# Patient Record
Sex: MALE | Race: WHITE | NOT HISPANIC OR LATINO | Employment: FULL TIME | URBAN - NONMETROPOLITAN AREA
[De-identification: names, ages, dates, MRNs, and addresses within clinical notes are randomized per-mention and may not be internally consistent; named-entity substitution may affect disease eponyms.]

---

## 2019-02-12 ENCOUNTER — HOSPITAL ENCOUNTER (EMERGENCY)
Facility: HOSPITAL | Age: 24
Discharge: HOME | End: 2019-02-12
Attending: PHYSICIAN ASSISTANT | Admitting: PHYSICIAN ASSISTANT

## 2019-02-12 ENCOUNTER — APPOINTMENT (OUTPATIENT)
Dept: GENERAL RADIOLOGY | Facility: HOSPITAL | Age: 24
End: 2019-02-12
Attending: PHYSICIAN ASSISTANT

## 2019-02-12 VITALS
RESPIRATION RATE: 14 BRPM | SYSTOLIC BLOOD PRESSURE: 111 MMHG | TEMPERATURE: 98.6 F | WEIGHT: 155 LBS | BODY MASS INDEX: 20.73 KG/M2 | OXYGEN SATURATION: 100 % | DIASTOLIC BLOOD PRESSURE: 76 MMHG

## 2019-02-12 DIAGNOSIS — R00.2 PALPITATIONS: ICD-10-CM

## 2019-02-12 DIAGNOSIS — R07.89 ATYPICAL CHEST PAIN: ICD-10-CM

## 2019-02-12 LAB
ALBUMIN SERPL-MCNC: 4.1 G/DL (ref 3.4–5)
ALP SERPL-CCNC: 70 U/L (ref 40–150)
ALT SERPL W P-5'-P-CCNC: 32 U/L (ref 0–70)
ANION GAP SERPL CALCULATED.3IONS-SCNC: 7 MMOL/L (ref 3–14)
AST SERPL W P-5'-P-CCNC: 17 U/L (ref 0–45)
BASOPHILS # BLD AUTO: 0.1 10E9/L (ref 0–0.2)
BASOPHILS NFR BLD AUTO: 0.7 %
BILIRUB SERPL-MCNC: 1.2 MG/DL (ref 0.2–1.3)
BUN SERPL-MCNC: 11 MG/DL (ref 7–30)
CALCIUM SERPL-MCNC: 9 MG/DL (ref 8.5–10.1)
CHLORIDE SERPL-SCNC: 106 MMOL/L (ref 94–109)
CO2 SERPL-SCNC: 27 MMOL/L (ref 20–32)
CREAT SERPL-MCNC: 0.81 MG/DL (ref 0.66–1.25)
D DIMER PPP DDU-MCNC: <200 NG/ML D-DU (ref 0–300)
DIFFERENTIAL METHOD BLD: NORMAL
EOSINOPHIL # BLD AUTO: 0.3 10E9/L (ref 0–0.7)
EOSINOPHIL NFR BLD AUTO: 2.8 %
ERYTHROCYTE [DISTWIDTH] IN BLOOD BY AUTOMATED COUNT: 12.2 % (ref 10–15)
GFR SERPL CREATININE-BSD FRML MDRD: >90 ML/MIN/{1.73_M2}
GLUCOSE SERPL-MCNC: 91 MG/DL (ref 70–99)
HCT VFR BLD AUTO: 44.4 % (ref 40–53)
HGB BLD-MCNC: 15.8 G/DL (ref 13.3–17.7)
IMM GRANULOCYTES # BLD: 0 10E9/L (ref 0–0.4)
IMM GRANULOCYTES NFR BLD: 0.5 %
LYMPHOCYTES # BLD AUTO: 2.3 10E9/L (ref 0.8–5.3)
LYMPHOCYTES NFR BLD AUTO: 25.7 %
MCH RBC QN AUTO: 30.4 PG (ref 26.5–33)
MCHC RBC AUTO-ENTMCNC: 35.6 G/DL (ref 31.5–36.5)
MCV RBC AUTO: 85 FL (ref 78–100)
MONOCYTES # BLD AUTO: 0.6 10E9/L (ref 0–1.3)
MONOCYTES NFR BLD AUTO: 6.7 %
NEUTROPHILS # BLD AUTO: 5.6 10E9/L (ref 1.6–8.3)
NEUTROPHILS NFR BLD AUTO: 63.6 %
NRBC # BLD AUTO: 0 10*3/UL
NRBC BLD AUTO-RTO: 0 /100
PLATELET # BLD AUTO: 206 10E9/L (ref 150–450)
POTASSIUM SERPL-SCNC: 4.3 MMOL/L (ref 3.4–5.3)
PROT SERPL-MCNC: 7.2 G/DL (ref 6.8–8.8)
RBC # BLD AUTO: 5.2 10E12/L (ref 4.4–5.9)
SODIUM SERPL-SCNC: 140 MMOL/L (ref 133–144)
TROPONIN I SERPL-MCNC: <0.015 UG/L (ref 0–0.04)
WBC # BLD AUTO: 8.8 10E9/L (ref 4–11)

## 2019-02-12 PROCEDURE — 84484 ASSAY OF TROPONIN QUANT: CPT | Performed by: PHYSICIAN ASSISTANT

## 2019-02-12 PROCEDURE — 36415 COLL VENOUS BLD VENIPUNCTURE: CPT | Performed by: PHYSICIAN ASSISTANT

## 2019-02-12 PROCEDURE — 71046 X-RAY EXAM CHEST 2 VIEWS: CPT | Mod: TC

## 2019-02-12 PROCEDURE — 80053 COMPREHEN METABOLIC PANEL: CPT | Performed by: PHYSICIAN ASSISTANT

## 2019-02-12 PROCEDURE — 85025 COMPLETE CBC W/AUTO DIFF WBC: CPT | Performed by: PHYSICIAN ASSISTANT

## 2019-02-12 PROCEDURE — 25000128 H RX IP 250 OP 636: Performed by: PHYSICIAN ASSISTANT

## 2019-02-12 PROCEDURE — 99285 EMERGENCY DEPT VISIT HI MDM: CPT | Mod: Z6 | Performed by: PHYSICIAN ASSISTANT

## 2019-02-12 PROCEDURE — 99285 EMERGENCY DEPT VISIT HI MDM: CPT | Mod: 25

## 2019-02-12 PROCEDURE — 93010 ELECTROCARDIOGRAM REPORT: CPT | Performed by: INTERNAL MEDICINE

## 2019-02-12 PROCEDURE — 85379 FIBRIN DEGRADATION QUANT: CPT | Performed by: PHYSICIAN ASSISTANT

## 2019-02-12 PROCEDURE — 93005 ELECTROCARDIOGRAM TRACING: CPT

## 2019-02-12 RX ORDER — SODIUM CHLORIDE 9 MG/ML
1000 INJECTION, SOLUTION INTRAVENOUS CONTINUOUS
Status: DISCONTINUED | OUTPATIENT
Start: 2019-02-12 | End: 2019-02-12 | Stop reason: HOSPADM

## 2019-02-12 RX ADMIN — SODIUM CHLORIDE 1000 ML: 9 INJECTION, SOLUTION INTRAVENOUS at 17:08

## 2019-02-12 ASSESSMENT — ENCOUNTER SYMPTOMS
VOMITING: 0
BACK PAIN: 0
NERVOUS/ANXIOUS: 1
SHORTNESS OF BREATH: 0
FATIGUE: 0
COUGH: 0
NAUSEA: 0
ACTIVITY CHANGE: 0
CHILLS: 0
WHEEZING: 0
CHEST TIGHTNESS: 0
APPETITE CHANGE: 0
PHOTOPHOBIA: 0
FEVER: 0
ABDOMINAL PAIN: 0

## 2019-02-12 NOTE — ED TRIAGE NOTES
"Pt states he was sitting on the couch playing video games when started feeling like he was getting tunnel vision and than felt hot \"and then my heart started beating really fast\". States both arms and left foot started tingling shortly after. States 2 weeks ago he had LOC while ice fishing after he stood up from sitting fast. C/O left chest pain \"mostly\" when he moves his arms. FAST neuro negative.  "

## 2019-02-12 NOTE — ED PROVIDER NOTES
"  History     Chief Complaint   Patient presents with     Irregular Heart Beat     The history is provided by the patient.     Leonel Norton is a 23 year old male who presented to the emergency department ambulatory along with significant other for evaluation of chest pain, palpitations, and bilateral arm paresthesias.  Patient tells me that he was sitting on his couch watching a movie when he began to develop a \"racing heart\" and bilateral arm tingling.  He began to have what seems to be a panic attack and attempted breathing exercises.  He then developed some foot paresthesias as well.  He began to control his palpitations and developed some left-sided chest pain.  He elected to present here for evaluation.  Currently reports feeling at his baseline other than left-sided chest discomfort.  Tells me he was ice fishing last week when he stood up quickly and had a near syncopal episode.  Denies any headaches.  Denies any blood thinners.  Denies any drugs or alcohol.  Denies any other questions or concerns.  Currently denies any dyspnea.    Allergies:  Allergies   Allergen Reactions     Lamotrigine      Topiramate      Topamax       Problem List:    Patient Active Problem List    Diagnosis Date Noted     ACP (advance care planning) 11/02/2016     Priority: Medium     Advance Care Planning 11/2/2016: ACP Review of Chart / Resources Provided:  Reviewed chart for advance care plan.  Leonel Norton has been provided information and resources to begin or update their advance care plan.  Added by Kiya Hsu                      Past Medical History:    Past Medical History:   Diagnosis Date     Attention deficit disorder of childhood with hyper 7/20/2012       Past Surgical History:    No past surgical history on file.    Family History:    Family History   Problem Relation Age of Onset     Diabetes Other         close relative     Hypertension Paternal Grandfather        Social History:  Marital Status:  Single " [1]  Social History     Tobacco Use     Smoking status: Current Every Day Smoker     Packs/day: 1.00   Substance Use Topics     Alcohol use: Yes     Comment: occassionaly      Drug use: No        Medications:      lisdexamfetamine (VYVANSE) 40 MG capsule         Review of Systems   Constitutional: Negative for activity change, appetite change, chills, fatigue and fever.   Eyes: Negative for photophobia and visual disturbance.   Respiratory: Negative for cough, chest tightness, shortness of breath and wheezing.    Cardiovascular:        Please see history of present illness.  The patient currently denies any questions or concerns.   Gastrointestinal: Negative for abdominal pain, nausea and vomiting.   Genitourinary: Negative.    Musculoskeletal: Negative for back pain.   Skin: Negative.    Neurological:        Please see HPI.   Psychiatric/Behavioral: The patient is nervous/anxious.        Physical Exam   BP: 137/89  Heart Rate: 69  Temp: 97.9  F (36.6  C)  Resp: 16  Weight: 70.3 kg (155 lb)  SpO2: 99 %      Physical Exam   Constitutional: He is oriented to person, place, and time. He appears well-developed and well-nourished. No distress.   HENT:   Head: Normocephalic and atraumatic.   Right Ear: External ear normal.   Left Ear: External ear normal.   Mouth/Throat: Oropharynx is clear and moist.   Tympanic membranes are negative.   Eyes: Conjunctivae and EOM are normal. Pupils are equal, round, and reactive to light.   Neck: Normal range of motion.   Cardiovascular: Normal rate and regular rhythm.   Pulmonary/Chest: Effort normal and breath sounds normal. He exhibits no tenderness.   Abdominal: Soft. He exhibits no distension. There is no tenderness. There is no guarding.   Musculoskeletal: He exhibits no edema.   Lymphadenopathy:     He has no cervical adenopathy.   Neurological: He is alert and oriented to person, place, and time.   Skin: Skin is warm and dry. Capillary refill takes less than 2 seconds.    Psychiatric: He has a normal mood and affect.   Nursing note and vitals reviewed.      ED Course        Procedures  EKG shows a sinus bradycardia at a rate of 59.  Normal WA interval.  Normal QRS duration.  Normal QTC.  Normal axis.  Normal P wave duration.  There are no concerning ST segments.  There are no concerning T waves.  There is no evidence of ectopy, preexcitation, or ischemia.  No previous EKGs available for comparison.        Chest x-ray is negative for focal infiltrate, pneumothorax, or widened mediastinum.    Critical Care time:  none               Results for orders placed or performed during the hospital encounter of 02/12/19 (from the past 24 hour(s))   CBC with platelets differential   Result Value Ref Range    WBC 8.8 4.0 - 11.0 10e9/L    RBC Count 5.20 4.4 - 5.9 10e12/L    Hemoglobin 15.8 13.3 - 17.7 g/dL    Hematocrit 44.4 40.0 - 53.0 %    MCV 85 78 - 100 fl    MCH 30.4 26.5 - 33.0 pg    MCHC 35.6 31.5 - 36.5 g/dL    RDW 12.2 10.0 - 15.0 %    Platelet Count 206 150 - 450 10e9/L    Diff Method Automated Method     % Neutrophils 63.6 %    % Lymphocytes 25.7 %    % Monocytes 6.7 %    % Eosinophils 2.8 %    % Basophils 0.7 %    % Immature Granulocytes 0.5 %    Nucleated RBCs 0 0 /100    Absolute Neutrophil 5.6 1.6 - 8.3 10e9/L    Absolute Lymphocytes 2.3 0.8 - 5.3 10e9/L    Absolute Monocytes 0.6 0.0 - 1.3 10e9/L    Absolute Eosinophils 0.3 0.0 - 0.7 10e9/L    Absolute Basophils 0.1 0.0 - 0.2 10e9/L    Abs Immature Granulocytes 0.0 0 - 0.4 10e9/L    Absolute Nucleated RBC 0.0    Comprehensive metabolic panel   Result Value Ref Range    Sodium 140 133 - 144 mmol/L    Potassium 4.3 3.4 - 5.3 mmol/L    Chloride 106 94 - 109 mmol/L    Carbon Dioxide 27 20 - 32 mmol/L    Anion Gap 7 3 - 14 mmol/L    Glucose 91 70 - 99 mg/dL    Urea Nitrogen 11 7 - 30 mg/dL    Creatinine 0.81 0.66 - 1.25 mg/dL    GFR Estimate >90 >60 mL/min/[1.73_m2]    GFR Estimate If Black >90 >60 mL/min/[1.73_m2]    Calcium 9.0 8.5 -  10.1 mg/dL    Bilirubin Total 1.2 0.2 - 1.3 mg/dL    Albumin 4.1 3.4 - 5.0 g/dL    Protein Total 7.2 6.8 - 8.8 g/dL    Alkaline Phosphatase 70 40 - 150 U/L    ALT 32 0 - 70 U/L    AST 17 0 - 45 U/L   D-Dimer ([facility],)   Result Value Ref Range    D-Dimer ng/mL <200 0 - 300 ng/ml D-DU   Troponin I   Result Value Ref Range    Troponin I ES <0.015 0.000 - 0.045 ug/L   XR Chest 2 Views    Narrative    XR CHEST 2 VW    HISTORY: 23 years Male chest pain    COMPARISON: 4/14/2011    TECHNIQUE: 2 views of the chest were obtained.    FINDINGS: Two views of the chest were obtained. Heart size and  pulmonary vascularity are within normal limits, lungs are clear on  both views. No consolidating air space opacities are present.          Impression    IMPRESSION: Clear chest.    CORKY KERN MD       Medications   0.9% sodium chloride BOLUS (1,000 mLs Intravenous New Bag 2/12/19 4267)     Followed by   sodium chloride 0.9% infusion (not administered)       Assessments & Plan (with Medical Decision Making)   Workup as above.  EKG is unremarkable.  Troponins unremarkable.  D-dimer is negative.  Chest x-ray is unremarkable.  Most consistent with panic attack.  However he needs close clinic follow-up.  Discussed Holter monitoring or the like.  Avoid alcohol.  Stop smoking.  Return here for any worsening symptoms, new symptoms, or other questions or concerns.    This document was prepared using a combination of typing and voice generated software.  While every attempt was made for accuracy, spelling and grammatical errors may exist.    I have reviewed the nursing notes.    I have reviewed the findings, diagnosis, plan and need for follow up with the patient.          Medication List      There are no discharge medications for this visit.         Final diagnoses:   Atypical chest pain   Palpitations       2/12/2019   [facility]     Adnrew Terrell PA-C  02/12/19 [id number]

## 2019-02-13 NOTE — DISCHARGE INSTRUCTIONS
Rest and stay hydrated.     Follow-up in the clinic to discuss further investigation.     Return here for any other questions or concerns.

## 2020-11-11 ENCOUNTER — ALLIED HEALTH/NURSE VISIT (OUTPATIENT)
Dept: FAMILY MEDICINE | Facility: OTHER | Age: 25
End: 2020-11-11
Attending: FAMILY MEDICINE
Payer: OTHER GOVERNMENT

## 2020-11-11 DIAGNOSIS — R09.89 CHEST CONGESTION: ICD-10-CM

## 2020-11-11 DIAGNOSIS — R05.9 COUGH: Primary | ICD-10-CM

## 2020-11-11 PROCEDURE — U0003 INFECTIOUS AGENT DETECTION BY NUCLEIC ACID (DNA OR RNA); SEVERE ACUTE RESPIRATORY SYNDROME CORONAVIRUS 2 (SARS-COV-2) (CORONAVIRUS DISEASE [COVID-19]), AMPLIFIED PROBE TECHNIQUE, MAKING USE OF HIGH THROUGHPUT TECHNOLOGIES AS DESCRIBED BY CMS-2020-01-R: HCPCS | Mod: ZL | Performed by: FAMILY MEDICINE

## 2020-11-11 PROCEDURE — C9803 HOPD COVID-19 SPEC COLLECT: HCPCS

## 2020-11-11 PROCEDURE — 99207 PR NO CHARGE NURSE ONLY: CPT

## 2020-11-12 LAB
SARS-COV-2 RNA SPEC QL NAA+PROBE: NOT DETECTED
SPECIMEN SOURCE: NORMAL

## 2020-12-20 ENCOUNTER — HEALTH MAINTENANCE LETTER (OUTPATIENT)
Age: 25
End: 2020-12-20

## 2021-10-03 ENCOUNTER — HEALTH MAINTENANCE LETTER (OUTPATIENT)
Age: 26
End: 2021-10-03

## 2022-01-22 ENCOUNTER — HEALTH MAINTENANCE LETTER (OUTPATIENT)
Age: 27
End: 2022-01-22

## 2022-09-04 ENCOUNTER — HEALTH MAINTENANCE LETTER (OUTPATIENT)
Age: 27
End: 2022-09-04

## 2023-03-03 ENCOUNTER — OFFICE VISIT (OUTPATIENT)
Dept: FAMILY MEDICINE | Facility: OTHER | Age: 28
End: 2023-03-03
Attending: NURSE PRACTITIONER
Payer: COMMERCIAL

## 2023-03-03 VITALS
BODY MASS INDEX: 26.75 KG/M2 | DIASTOLIC BLOOD PRESSURE: 80 MMHG | SYSTOLIC BLOOD PRESSURE: 128 MMHG | TEMPERATURE: 97.5 F | HEART RATE: 82 BPM | WEIGHT: 200 LBS | OXYGEN SATURATION: 99 %

## 2023-03-03 DIAGNOSIS — J01.90 ACUTE SINUSITIS WITH SYMPTOMS > 10 DAYS: Primary | ICD-10-CM

## 2023-03-03 PROCEDURE — 99213 OFFICE O/P EST LOW 20 MIN: CPT | Performed by: NURSE PRACTITIONER

## 2023-03-03 RX ORDER — FLUTICASONE PROPIONATE 50 MCG
1 SPRAY, SUSPENSION (ML) NASAL DAILY
Qty: 16 G | Refills: 3 | Status: SHIPPED | OUTPATIENT
Start: 2023-03-03 | End: 2023-05-31 | Stop reason: ALTCHOICE

## 2023-03-03 RX ORDER — PSEUDOEPHEDRINE HCL 60 MG
60 TABLET ORAL EVERY 4 HOURS PRN
COMMUNITY
End: 2023-05-31 | Stop reason: ALTCHOICE

## 2023-03-03 ASSESSMENT — PAIN SCALES - GENERAL: PAINLEVEL: MODERATE PAIN (4)

## 2023-03-03 NOTE — PROGRESS NOTES
Assessment & Plan     Acute sinusitis with symptoms > 10 days  Sinus pressure and swelling noted to the left side of his face.  Plan to treat with antibiotics and self sinus care.    Discussed medication and side effects  Follow up if no improvement   - amoxicillin-clavulanate (AUGMENTIN) 875-125 MG tablet; Take 1 tablet by mouth 2 times daily for 7 days  - fluticasone (FLONASE) 50 MCG/ACT nasal spray; Spray 1 spray into both nostrils daily         Nicotine/Tobacco Cessation:  He reports that he has been smoking cigarettes. He has been smoking an average of 1 pack per day. He does not have any smokeless tobacco history on file.  Nicotine/Tobacco Cessation Plan:   Encouraged smoking cessation       See Patient Instructions    No follow-ups on file.    BATSHEVA Stauffer [facility] - MARILU Castaneda is a 27 year old accompanied by his partner, presenting for the following health issues:  Sinus Problem      HPI     Acute Illness  Acute illness concerns: sinus pressure with headache  Onset/Duration: 1 week or more   Symptoms:  Fever: No  Chills/Sweats: No  Headache (location?): YES  Sinus Pressure: YES  Conjunctivitis:  No  Ear Pain: no  Rhinorrhea: YES  Congestion: No  Sore Throat: No  Cough: YES-productive of black sputum  Wheeze: No  Decreased Appetite: No  Nausea: No  Vomiting: No  Diarrhea: No  Dysuria/Freq.: No  Dysuria or Hematuria: No  Fatigue/Achiness: YES  Sick/Strep Exposure: No  Therapies tried and outcome: sudafed  Staying hydrated and drinking water       Review of Systems   CONSTITUTIONAL: NEGATIVE for fever, chills, change in weight  INTEGUMENTARY/SKIN: NEGATIVE for worrisome rashes, moles or lesions  EYES: watery drainage from left eye   ENT/MOUTH: postnasal drainage and sinus pressure  RESP:NEGATIVE for significant cough or SOB  CV: NEGATIVE for chest pain, palpitations or peripheral edema  GI: NEGATIVE for nausea, abdominal pain, heartburn, or change in bowel  habits  : negative for dysuria, hematuria, decreased urinary stream, erectile dysfunction  NEURO: sinus headache       Objective    /80   Pulse 82   Temp 97.5  F (36.4  C) (Tympanic)   Wt 90.7 kg (200 lb)   SpO2 99%   BMI 26.75 kg/m    Body mass index is 26.75 kg/m .  Physical Exam   GENERAL: alert and no distress  EYES: Eyes grossly normal to inspection, PERRL and conjunctivae and sclerae normal  HENT: normal cephalic/atraumatic, ear canals and TM's normal, nasal mucosa edematous , oropharynx clear, oral mucous membranes moist and sinuses: maxillary, frontal tenderness on left, maxillary, frontal swelling on left  RESP: lungs clear to auscultation - no rales, rhonchi or wheezes  CV: regular rate and rhythm, normal S1 S2, no S3 or S4, no murmur, click or rub, no peripheral edema and peripheral pulses strong  ABDOMEN: soft, nontender, no hepatosplenomegaly, no masses and bowel sounds normal  NEURO: Normal strength and tone, sensory exam grossly normal, mentation intact and fatigued appearing

## 2023-03-03 NOTE — PATIENT INSTRUCTIONS
Sinusitis (Antibiotic Treatment)    The sinuses are air-filled spaces within the bones of the face. They connect to the inside of the nose. Sinusitis is an inflammation of the tissue that lines the sinuses. Sinusitis can occur during a cold. It can also happen due to allergies to pollens and other particles in the air. Sinusitis can cause symptoms of sinus congestion and a feeling of fullness. A sinus infection causes fever, headache, and facial pain. There is often green or yellow fluid draining from the nose or into the back of the throat (post-nasal drip). You have been given antibiotics to treat this condition.   Home care    Take the full course of antibiotics as instructed. Don't stop taking them, even when you feel better.    Drink plenty of water, hot tea, and other liquids as directed by the healthcare provider. This may help thin nasal mucus. It also may help your sinuses drain fluids.    Heat may help soothe painful areas of your face. Use a towel soaked in hot water. Or,  the shower and direct the warm spray onto your face. Using a vaporizer along with a menthol rub at night may also help soothe symptoms.     An expectorant with guaifenesin may help thin nasal mucus and help your sinuses drain fluids. Talk with your provider or pharmacists before taking an over-the-counter (OTC) medicine if you have any questions about it or its side effects..    You can use an OTC decongestant, unless a similar medicine was prescribed to you. Nasal sprays work the fastest. Use one that contains phenylephrine or oxymetazoline. First blow your nose gently. Then use the spray. Don't use these medicines more often than directed on the label. If you do, your symptoms may get worse. You may also take pills that contain pseudoephedrine. Don t use products that combine multiple medicines. This is because side effects may be increased. Read labels. You can also ask the pharmacist for help. (People with high blood  pressure should not use decongestants. They can raise blood pressure.) Talk with your provider or pharmacist if you have any questions about the medicine..    OTC antihistamines may help if allergies contributed to your sinusitis. Talk with your provider or pharmacist if you have any questions about the medicine..    Don't use nasal rinses or irrigation during an acute sinus infection, unless your healthcare provider tells you to. Rinsing may spread the infection to other areas in your sinuses.    Use acetaminophen or ibuprofen to control pain, unless another pain medicine was prescribed to you. If you have chronic liver or kidney disease or ever had a stomach ulcer, talk with your healthcare provider before using these medicines. Never give aspirin to anyone under age 18 who is ill with a fever. It may cause severe liver damage.    Don't smoke. This can make symptoms worse.    Follow-up care  Follow up with your healthcare provider, or as advised.   When to seek medical advice  Call your healthcare provider if any of these occur:     Facial pain or headache that gets worse    Stiff neck    Unusual drowsiness or confusion    Swelling of your forehead or eyelids    Symptoms don't go away in 10 days    Vision problems, such as blurred or double vision    Fever of 100.4 F (38 C) or higher, or as directed by your healthcare provider  Call 911  Call 911 if any of these occur:     Seizure    Trouble breathing    Feeling dizzy or faint    Fingernails, skin or lips look blue, purple , or gray  Prevention  Here are steps you can take to help prevent an infection:     Keep good hand washing habits.    Don t have close contact with people who have sore throats, colds, or other upper respiratory infections.    Don t smoke, and stay away from secondhand smoke.    Stay up to date with of your vaccines.  [employer] last reviewed this educational content on 12/1/2019 2000-2021 [employer]. All rights reserved. This  information is not intended as a substitute for professional medical care. Always follow your healthcare professional's instructions.        You may want to try a nasal lavage (also known as nasal irrigation). You can find over-the-counter products, such as Neti-Pot, at retail locations or make your own at home. Instructions for homemade nasal lavage and more information on the process are available online at http://www.aafp.org/afp/2009/1115/p1121.html.

## 2023-04-28 ENCOUNTER — TELEPHONE (OUTPATIENT)
Dept: FAMILY MEDICINE | Facility: OTHER | Age: 28
End: 2023-04-28

## 2023-04-28 ENCOUNTER — OFFICE VISIT (OUTPATIENT)
Dept: FAMILY MEDICINE | Facility: OTHER | Age: 28
End: 2023-04-28
Attending: PHYSICIAN ASSISTANT
Payer: COMMERCIAL

## 2023-04-28 VITALS
HEART RATE: 93 BPM | RESPIRATION RATE: 16 BRPM | BODY MASS INDEX: 28.22 KG/M2 | OXYGEN SATURATION: 98 % | WEIGHT: 211 LBS | SYSTOLIC BLOOD PRESSURE: 118 MMHG | TEMPERATURE: 97.6 F | DIASTOLIC BLOOD PRESSURE: 75 MMHG

## 2023-04-28 DIAGNOSIS — F90.0 ATTENTION DEFICIT HYPERACTIVITY DISORDER (ADHD), PREDOMINANTLY INATTENTIVE TYPE: Primary | ICD-10-CM

## 2023-04-28 LAB
AMPHETAMINES UR QL: NOT DETECTED
BARBITURATES UR QL SCN: NOT DETECTED
BENZODIAZ UR QL SCN: NOT DETECTED
BUPRENORPHINE UR QL: NOT DETECTED
CANNABINOIDS UR QL: NOT DETECTED
COCAINE UR QL SCN: NOT DETECTED
D-METHAMPHET UR QL: NOT DETECTED
METHADONE UR QL SCN: NOT DETECTED
OPIATES UR QL SCN: NOT DETECTED
OXYCODONE UR QL SCN: NOT DETECTED
PCP UR QL SCN: NOT DETECTED
PROPOXYPH UR QL: NOT DETECTED
TRICYCLICS UR QL SCN: NOT DETECTED

## 2023-04-28 PROCEDURE — 80306 DRUG TEST PRSMV INSTRMNT: CPT | Performed by: PHYSICIAN ASSISTANT

## 2023-04-28 PROCEDURE — 99212 OFFICE O/P EST SF 10 MIN: CPT | Performed by: PHYSICIAN ASSISTANT

## 2023-04-28 RX ORDER — LISDEXAMFETAMINE DIMESYLATE 40 MG/1
40 CAPSULE ORAL EVERY MORNING
Qty: 30 CAPSULE | Refills: 0 | Status: SHIPPED | OUTPATIENT
Start: 2023-04-28 | End: 2023-05-31 | Stop reason: ALTCHOICE

## 2023-04-28 RX ORDER — DEXTROAMPHETAMINE SACCHARATE, AMPHETAMINE ASPARTATE, DEXTROAMPHETAMINE SULFATE AND AMPHETAMINE SULFATE 5; 5; 5; 5 MG/1; MG/1; MG/1; MG/1
20 TABLET ORAL 2 TIMES DAILY
Qty: 60 TABLET | Refills: 0 | Status: SHIPPED | OUTPATIENT
Start: 2023-04-28 | End: 2023-05-31 | Stop reason: ALTCHOICE

## 2023-04-28 ASSESSMENT — PATIENT HEALTH QUESTIONNAIRE - PHQ9
10. IF YOU CHECKED OFF ANY PROBLEMS, HOW DIFFICULT HAVE THESE PROBLEMS MADE IT FOR YOU TO DO YOUR WORK, TAKE CARE OF THINGS AT HOME, OR GET ALONG WITH OTHER PEOPLE: VERY DIFFICULT
SUM OF ALL RESPONSES TO PHQ QUESTIONS 1-9: 12
SUM OF ALL RESPONSES TO PHQ QUESTIONS 1-9: 12

## 2023-04-28 ASSESSMENT — PAIN SCALES - GENERAL: PAINLEVEL: NO PAIN (0)

## 2023-04-28 NOTE — TELEPHONE ENCOUNTER
Patient states copay for vyvanse is $340   Cannot afford to pay tis amount    Asking if PCP would consider switch to Adderall  States he researched assistance programs & would like to try if PCP approves

## 2023-04-28 NOTE — PROGRESS NOTES
Assessment & Plan     Attention deficit hyperactivity disorder (ADHD), predominantly inattentive type  He hasn't seen me since age 16.  He needs to now work on computers and when he is active its not as bad, but really struggling now.   - lisdexamfetamine (VYVANSE) 40 MG capsule; Take 1 capsule (40 mg) by mouth every morning    Review of external notes as documented elsewhere in note  Ordering of each unique test  Prescription drug management  10  minutes spent by me on the date of the encounter doing chart review, history and exam, documentation and further activities per the note       See Patient Instructions    No follow-ups on file.    Dulce Maria Chapman PA-C  [facility] - MARILU Castaneda is a 27 year old, presenting for the following health issues:  A.D.H.D        4/28/2023     8:32 AM   Additional Questions   Roomed by Thom Cline LPN   Accompanied by self         4/28/2023     8:32 AM   Patient Reported Additional Medications   Patient reports taking the following new medications none     HPI     ADHD    Onset: since childhood     Description:   Easily distracted: YES  Short attention span: YES  Trouble following directions: YES   Impulsive behavior: No   Trouble completing tasks: YES    Accompanying Signs & Symptoms:        Change in sleep pattern: none  Irritability at certain times of the day: No  Socially withdrawn: not very social  Depression symptoms: No  Anxiety symptoms: No    History:  Caffeine intake: Moderate  Loss of appetite: No  Healthy diet: tries to limit junk food  Did you have problems in school or with previous employment: YES- struggled in school  Family history of ADHD: No  Have you had an evaluation for ADHD in the past: YES  Do you use alcohol or drugs: YES- beer on occasion    Therapies tried: Adderall (concerta), vyvanse with total relief            Review of Systems   Constitutional, HEENT, cardiovascular, pulmonary, gi and gu systems are negative,  except as otherwise noted.      Objective    /75 (BP Location: Left arm, Patient Position: Sitting, Cuff Size: Adult Large)   Pulse 93   Temp 97.6  F (36.4  C) (Tympanic)   Resp 16   Wt 95.7 kg (211 lb)   SpO2 98%   BMI 28.22 kg/m    Body mass index is 28.22 kg/m .  Physical Exam   GENERAL: healthy, alert and no distress  EYES: Eyes grossly normal to inspection, PERRL and conjunctivae and sclerae normal  HENT: ear canals and TM's normal, nose and mouth without ulcers or lesions  NECK: no adenopathy, no asymmetry, masses, or scars and thyroid normal to palpation  RESP: lungs clear to auscultation - no rales, rhonchi or wheezes  CV: regular rate and rhythm, normal S1 S2, no S3 or S4, no murmur, click or rub, no peripheral edema and peripheral pulses strong  ABDOMEN: soft, nontender, no hepatosplenomegaly, no masses and bowel sounds normal  MS: no gross musculoskeletal defects noted, no edema  SKIN: no suspicious lesions or rashes  BACK: no CVA tenderness, no paralumbar tenderness  PSYCH: mentation appears normal, affect normal/bright  LYMPH: no cervical, supraclavicular, axillary, or inguinal adenopathy    He is going to be given drug screen follow up in a month for dose adjustment if needed.          Answers for HPI/ROS submitted by the patient on 4/28/2023  If you checked off any problems, how difficult have these problems made it for you to do your work, take care of things at home, or get along with other people?: Very difficult  PHQ9 TOTAL SCORE: 12

## 2023-04-29 ENCOUNTER — HEALTH MAINTENANCE LETTER (OUTPATIENT)
Age: 28
End: 2023-04-29

## 2023-05-31 ENCOUNTER — OFFICE VISIT (OUTPATIENT)
Dept: FAMILY MEDICINE | Facility: OTHER | Age: 28
End: 2023-05-31
Payer: COMMERCIAL

## 2023-05-31 ENCOUNTER — MYC MEDICAL ADVICE (OUTPATIENT)
Dept: FAMILY MEDICINE | Facility: OTHER | Age: 28
End: 2023-05-31

## 2023-05-31 VITALS
HEART RATE: 101 BPM | SYSTOLIC BLOOD PRESSURE: 138 MMHG | OXYGEN SATURATION: 100 % | BODY MASS INDEX: 27.02 KG/M2 | WEIGHT: 202 LBS | RESPIRATION RATE: 16 BRPM | TEMPERATURE: 97.6 F | DIASTOLIC BLOOD PRESSURE: 80 MMHG

## 2023-05-31 DIAGNOSIS — F90.0 ATTENTION DEFICIT HYPERACTIVITY DISORDER (ADHD), PREDOMINANTLY INATTENTIVE TYPE: ICD-10-CM

## 2023-05-31 DIAGNOSIS — F90.0 ATTENTION DEFICIT HYPERACTIVITY DISORDER (ADHD), PREDOMINANTLY INATTENTIVE TYPE: Primary | ICD-10-CM

## 2023-05-31 PROCEDURE — 99213 OFFICE O/P EST LOW 20 MIN: CPT | Performed by: PHYSICIAN ASSISTANT

## 2023-05-31 RX ORDER — DEXTROAMPHETAMINE SACCHARATE, AMPHETAMINE ASPARTATE, DEXTROAMPHETAMINE SULFATE AND AMPHETAMINE SULFATE 2.5; 2.5; 2.5; 2.5 MG/1; MG/1; MG/1; MG/1
20 TABLET ORAL 2 TIMES DAILY
COMMUNITY
Start: 2023-05-02 | End: 2023-05-31 | Stop reason: ALTCHOICE

## 2023-05-31 RX ORDER — DEXTROAMPHETAMINE SACCHARATE, AMPHETAMINE ASPARTATE MONOHYDRATE, DEXTROAMPHETAMINE SULFATE AND AMPHETAMINE SULFATE 7.5; 7.5; 7.5; 7.5 MG/1; MG/1; MG/1; MG/1
30 CAPSULE, EXTENDED RELEASE ORAL DAILY
Qty: 30 CAPSULE | Refills: 0 | Status: SHIPPED | OUTPATIENT
Start: 2023-05-31 | End: 2023-06-02

## 2023-05-31 ASSESSMENT — PAIN SCALES - GENERAL: PAINLEVEL: NO PAIN (0)

## 2023-05-31 NOTE — PROGRESS NOTES
Assessment & Plan     Attention deficit hyperactivity disorder (ADHD), predominantly inattentive type  Change to XR.  Using good RX script.  Try to get the generic brand.  See us back in via My Chart in about 3 weeks notices not working longer than 6 hours.   - amphetamine-dextroamphetamine (ADDERALL XR) 30 MG 24 hr capsule; Take 1 capsule (30 mg) by mouth daily    Review of external notes as documented elsewhere in note  Ordering of each unique test  Prescription drug management  10  minutes spent by me on the date of the encounter doing chart review, history and exam, documentation and further activities per the note       See Patient Instructions    No follow-ups on file.    MELVI Sheppard  [facility] - MARILU Castaneda is a 28 year old, presenting for the following health issues:  Follow Up        5/31/2023     9:29 AM   Additional Questions   Roomed by Thom Cline LPN   Accompanied by self         5/31/2023     9:29 AM   Patient Reported Additional Medications   Patient reports taking the following new medications none     HPI     ADHD    Onset: since childhood     Description:   Easily distracted: YES  Short attention span: YES  Trouble following directions: YES   Impulsive behavior: No   Trouble completing tasks: YES    Accompanying Signs & Symptoms:        Change in sleep pattern: none  Irritability at certain times of the day: No  Socially withdrawn: not very social  Depression symptoms: No  Anxiety symptoms: No    History:  Caffeine intake: Moderate  Loss of appetite: No  Healthy diet: tries to limit junk food  Did you have problems in school or with previous employment: YES- struggled in school  Family history of ADHD: No  Have you had an evaluation for ADHD in the past: YES  Do you use alcohol or drugs: YES- beer on occasion     Therapies tried: Adderall (concerta), Vyvanse with total relief. Currently not taking vyvanse. And adderall is not as effective as  vyvanse.Currently can not able afford vyvanse.            Review of Systems   Constitutional, HEENT, cardiovascular, pulmonary, gi and gu systems are negative, except as otherwise noted.      Objective    /80 (BP Location: Right arm, Patient Position: Sitting, Cuff Size: Adult Large)   Pulse 101   Temp 97.6  F (36.4  C) (Tympanic)   Resp 16   Wt 91.6 kg (202 lb)   SpO2 100%   BMI 27.02 kg/m    Body mass index is 27.02 kg/m .  Physical Exam   GENERAL: healthy, alert and no distress  RESP: lungs clear to auscultation - no rales, rhonchi or wheezes  CV: regular rate and rhythm, normal S1 S2, no S3 or S4, no murmur, click or rub, no peripheral edema and peripheral pulses strong  MS: no gross musculoskeletal defects noted, no edema  PSYCH: mentation appears normal, affect normal/bright    Office Visit on 04/28/2023   Component Date Value Ref Range Status     Cannabinoids (10-twe-3-carboxy-9-T* 04/28/2023 Not Detected  Not Detected, Indeterminate Final    Cutoff for a negative cannabinoid is 50 ng/mL or less.     Phencyclidine 04/28/2023 Not Detected  Not Detected, Indeterminate Final    Cutoff for a negative PCP is 25 ng/mL or less.     Cocaine (Benzoylecgonine) 04/28/2023 Not Detected  Not Detected, Indeterminate Final    Cutoff for a negative cocaine is 150 ng/ml or less.     Methamphetamine (d-Methamphetamine) 04/28/2023 Not Detected  Not Detected, Indeterminate Final    Cutoff for a negative methamphetamine is 500 ng/ml or less.     Opiates (Morphine) 04/28/2023 Not Detected  Not Detected, Indeterminate Final    Cutoff for a negative opiate is 100 ng/ml or less.     Amphetamine (d-Amphetamine) 04/28/2023 Not Detected  Not Detected, Indeterminate Final    Cutoff for a negative amphetamine is 500 ng/mL or less.     Benzodiazepines (Nordiazepam) 04/28/2023 Not Detected  Not Detected, Indeterminate Final    Cutoff for a negative benzodiazepine is 150 ng/ml or less.     Tricyclic Antidepressants (Desipra*  04/28/2023 Not Detected  Not Detected, Indeterminate Final    Cutoff for a negative tricyclic antidepressant is 300 ng/ml or less.     Methadone 04/28/2023 Not Detected  Not Detected, Indeterminate Final    Cutoff for a negative methadone is 200 ng/ml or less.     Barbiturates (Butalbital) 04/28/2023 Not Detected  Not Detected, Indeterminate Final    Cutoff for a negative barbituate is 200 ng/ml or less.     Oxycodone 04/28/2023 Not Detected  Not Detected, Indeterminate Final    Cutoff for a negative oxycodone is 100 ng/mL or less.     Propoxyphene (Norpropoxyphene) 04/28/2023 Not Detected  Not Detected, Indeterminate Final    Cutoff for a negative propoxyphene is 300 ng/ml or less.     Buprenorphine 04/28/2023 Not Detected  Not Detected, Indeterminate Final    Cutoff for a negative buprenorphine is 10 ng/ml or less.

## 2023-06-01 NOTE — TELEPHONE ENCOUNTER
Pt would like her Adderall XR sent to [facility] as [facility] does not have them available at this time.      [facility]    She was also wondering is she should discard the last of the current medication and start taking the new?    Adderall XR pended.  Please review and sign if appropriate.

## 2023-06-02 RX ORDER — DEXTROAMPHETAMINE SACCHARATE, AMPHETAMINE ASPARTATE MONOHYDRATE, DEXTROAMPHETAMINE SULFATE AND AMPHETAMINE SULFATE 7.5; 7.5; 7.5; 7.5 MG/1; MG/1; MG/1; MG/1
30 CAPSULE, EXTENDED RELEASE ORAL DAILY
Qty: 30 CAPSULE | Refills: 0 | Status: SHIPPED | OUTPATIENT
Start: 2023-06-02 | End: 2023-06-08

## 2023-06-08 DIAGNOSIS — F90.0 ATTENTION DEFICIT HYPERACTIVITY DISORDER (ADHD), PREDOMINANTLY INATTENTIVE TYPE: ICD-10-CM

## 2023-06-08 RX ORDER — DEXTROAMPHETAMINE SACCHARATE, AMPHETAMINE ASPARTATE MONOHYDRATE, DEXTROAMPHETAMINE SULFATE AND AMPHETAMINE SULFATE 7.5; 7.5; 7.5; 7.5 MG/1; MG/1; MG/1; MG/1
30 CAPSULE, EXTENDED RELEASE ORAL DAILY
Qty: 30 CAPSULE | Refills: 0 | Status: SHIPPED | OUTPATIENT
Start: 2023-06-08 | End: 2023-07-27

## 2023-06-23 ENCOUNTER — MYC MEDICAL ADVICE (OUTPATIENT)
Dept: FAMILY MEDICINE | Facility: OTHER | Age: 28
End: 2023-06-23

## 2023-07-27 ENCOUNTER — MYC REFILL (OUTPATIENT)
Dept: FAMILY MEDICINE | Facility: OTHER | Age: 28
End: 2023-07-27

## 2023-07-27 DIAGNOSIS — F90.0 ATTENTION DEFICIT HYPERACTIVITY DISORDER (ADHD), PREDOMINANTLY INATTENTIVE TYPE: ICD-10-CM

## 2023-07-28 RX ORDER — DEXTROAMPHETAMINE SACCHARATE, AMPHETAMINE ASPARTATE MONOHYDRATE, DEXTROAMPHETAMINE SULFATE AND AMPHETAMINE SULFATE 7.5; 7.5; 7.5; 7.5 MG/1; MG/1; MG/1; MG/1
30 CAPSULE, EXTENDED RELEASE ORAL DAILY
Qty: 30 CAPSULE | Refills: 0 | Status: SHIPPED | OUTPATIENT
Start: 2023-07-28 | End: 2023-08-28

## 2023-07-28 NOTE — TELEPHONE ENCOUNTER
Adderall  Last Written Prescription Date: 6/8/23  Last Fill Quantity: 30 # of Refills: 0  Last Office Visit: 5/31/23     Physical Therapy Daily Treatment     Visit Count: 5  Plan of Care Dates: Initial: 7/5/17 Through: 8/16/17  Insurance Information: [facility] CARE - CHOICE PLUS  $50 COPAY  NO PC  60 VISITS COMBINED (PT/ST/OT) PER CALENDAR YEAR     OOP - $5000/4836.53 MET  ONCE OOP IS MET, COVERED %     REFERENCE #[id number]  JD  Next Referring Provider Visit: none scheduled    Referred by: Shari Banegas NP  Medical Diagnosis (from order):   Athletic pubalgia Pain, Impaired Range of Motion and Impaired Motor Function/Muscle Performance  Insurance: 1. [employer]  2. N/A    Date of Onset: December 2016  Diagnosis Precautions: none  Chart reviewed: Relevant co-morbidities, allergies, tests and medications: IBS, HTN, mesenteric adenitis, hyperplastic polyp of stomach      SUBJECTIVE   Pain on the right side of the abdomen area, the pain is less severe since starting therapy, but still frustrated that it is present  Current Pain: 1-1.5/10.    Functional Change: Feels his pain is a little better.  Was having good relief from the pain, but feels his pain relief has been getting longer.  The US was helpful for his pain.  Had complete relief for about 3 days.  Reports feeling 25% better since starting physical therapy. Got an abdominal binder which helps during work and with physical activity and this helps with his pain.      OBJECTIVE   None Today    Treatment   Therapeutic Exercise: 20 minutes  Hook lying squirms 10x bilateral   Standing side bending with 20# to the left 10x  Landmine on total body resistance exercise 5x bilateral   Chop and lift with blue medicine ball 5x each direction    * Educated patient on wearing abdominal brace at all times for the next week and gradually weaning out of it after that    Manual Therapy: 10 minutes  Patient has been made aware of potential contraindications and possible risks associated with the use of the following manual interventions and has agreed.   Instrument assisted soft  tissue mobilization to right external oblique with moderate pressure and moderate erythema response    Modalities: 8 minutes  Patient has been made aware of potential contraindications and possible risks associated with the use of the following modalities and has agreed.  Ultrasound (77463):  Location: Right lower quadrant of abdomen; Position: supine; Duration: 7 minutes.  Intensity: 1.0 W/cm2, Duty Cycle: 50% duty cycle; Frequency: 3.3 Mhz.  Modality treatment resulted in no change in symptoms immediately following modality.  Patient reports no adverse reaction to treatment.    Current Home Program (not performed this date except as noted above):   With arms at sides in hook lying crunch with slow lowering 10x  With arms reaching towards left outer knee in hook lying crunch with slow lowering 10x  With UE assistance bilateral knees to chest with eccentric lowering 10x  Prone press-up 20 second holds 2x  quadruped left side bending 20 second holds 2x  Hook lying squirms  Standing side bending with weight in left arm    ASSESSMENT   Patient continues to have soft tissue restriction of the right external obliques, but with soft tissue mobilization and US pain and tissue mobility improves.  Patient continues to deny any visceral symptoms associated with his pain.  Recommend wearing abdominal brace at all times for the next week and gradually wean out the following week to improve pain symptoms.  Will follow-up with patient in 2 weeks.    Pain after treatment: 0/10  Result of above outlined education: Verbalizes understanding, Demonstrates understanding and Needs reinforcement    PLAN   Progress abdominal exercises. Update HEP. Continue with US and/or instrument assisted soft tissue mobilizations as needed.     THERAPY DAILY BILLING   Primary Insurance: [employer]  Secondary Insurance: N/A    Evaluation Procedures:  No evaluation codes were used on this date of service    Timed Procedures:  Manual Therapy, 10  minutes  Therapeutic Exercise, 20 minutes  Ultrasound, 8 minutes    Untimed Procedures:  No untimed codes were used on this date of service    Total Treatment Time: 38 minutes

## 2023-08-28 ENCOUNTER — MYC REFILL (OUTPATIENT)
Dept: FAMILY MEDICINE | Facility: OTHER | Age: 28
End: 2023-08-28

## 2023-08-28 DIAGNOSIS — F90.0 ATTENTION DEFICIT HYPERACTIVITY DISORDER (ADHD), PREDOMINANTLY INATTENTIVE TYPE: ICD-10-CM

## 2023-08-29 NOTE — TELEPHONE ENCOUNTER
Adderall      Last Written Prescription Date:  7.28.23  Last Fill Quantity: #30,   # refills: 0  Last Office Visit: 5.31.23  Future Office visit:       Routing refill request to provider for review/approval because:  Drug not on the [facility], P or [facility] refill protocol or controlled substance

## 2023-08-30 ENCOUNTER — MYC MEDICAL ADVICE (OUTPATIENT)
Dept: FAMILY MEDICINE | Facility: OTHER | Age: 28
End: 2023-08-30

## 2023-08-30 ENCOUNTER — MYC REFILL (OUTPATIENT)
Dept: FAMILY MEDICINE | Facility: OTHER | Age: 28
End: 2023-08-30

## 2023-08-30 DIAGNOSIS — F90.0 ATTENTION DEFICIT HYPERACTIVITY DISORDER (ADHD), PREDOMINANTLY INATTENTIVE TYPE: ICD-10-CM

## 2023-08-30 RX ORDER — DEXTROAMPHETAMINE SACCHARATE, AMPHETAMINE ASPARTATE MONOHYDRATE, DEXTROAMPHETAMINE SULFATE AND AMPHETAMINE SULFATE 7.5; 7.5; 7.5; 7.5 MG/1; MG/1; MG/1; MG/1
30 CAPSULE, EXTENDED RELEASE ORAL DAILY
Qty: 30 CAPSULE | Refills: 0 | Status: CANCELLED | OUTPATIENT
Start: 2023-08-30

## 2023-08-31 ENCOUNTER — MYC REFILL (OUTPATIENT)
Dept: FAMILY MEDICINE | Facility: OTHER | Age: 28
End: 2023-08-31

## 2023-08-31 DIAGNOSIS — F90.0 ATTENTION DEFICIT HYPERACTIVITY DISORDER (ADHD), PREDOMINANTLY INATTENTIVE TYPE: ICD-10-CM

## 2023-08-31 RX ORDER — DEXTROAMPHETAMINE SACCHARATE, AMPHETAMINE ASPARTATE MONOHYDRATE, DEXTROAMPHETAMINE SULFATE AND AMPHETAMINE SULFATE 7.5; 7.5; 7.5; 7.5 MG/1; MG/1; MG/1; MG/1
30 CAPSULE, EXTENDED RELEASE ORAL DAILY
Qty: 30 CAPSULE | Refills: 0 | OUTPATIENT
Start: 2023-08-31

## 2023-08-31 RX ORDER — DEXTROAMPHETAMINE SACCHARATE, AMPHETAMINE ASPARTATE MONOHYDRATE, DEXTROAMPHETAMINE SULFATE AND AMPHETAMINE SULFATE 7.5; 7.5; 7.5; 7.5 MG/1; MG/1; MG/1; MG/1
30 CAPSULE, EXTENDED RELEASE ORAL DAILY
Qty: 30 CAPSULE | Refills: 0 | Status: SHIPPED | OUTPATIENT
Start: 2023-08-31 | End: 2023-09-27

## 2023-09-27 ENCOUNTER — MYC REFILL (OUTPATIENT)
Dept: FAMILY MEDICINE | Facility: OTHER | Age: 28
End: 2023-09-27

## 2023-09-27 DIAGNOSIS — F90.0 ATTENTION DEFICIT HYPERACTIVITY DISORDER (ADHD), PREDOMINANTLY INATTENTIVE TYPE: ICD-10-CM

## 2023-09-29 RX ORDER — DEXTROAMPHETAMINE SACCHARATE, AMPHETAMINE ASPARTATE MONOHYDRATE, DEXTROAMPHETAMINE SULFATE AND AMPHETAMINE SULFATE 7.5; 7.5; 7.5; 7.5 MG/1; MG/1; MG/1; MG/1
30 CAPSULE, EXTENDED RELEASE ORAL DAILY
Qty: 30 CAPSULE | Refills: 0 | Status: SHIPPED | OUTPATIENT
Start: 2023-09-29 | End: 2023-10-25

## 2023-09-29 NOTE — TELEPHONE ENCOUNTER
Adderall      Last Written Prescription Date:  8.31.23  Last Fill Quantity: #30,   # refills: 0  Last Office Visit: 5.31.23  Future Office visit:       Routing refill request to provider for review/approval because:  Drug not on the [facility], P or [facility] refill protocol or controlled substance

## 2023-11-27 ENCOUNTER — MYC REFILL (OUTPATIENT)
Dept: FAMILY MEDICINE | Facility: OTHER | Age: 28
End: 2023-11-27

## 2023-11-27 DIAGNOSIS — F90.0 ATTENTION DEFICIT HYPERACTIVITY DISORDER (ADHD), PREDOMINANTLY INATTENTIVE TYPE: ICD-10-CM

## 2023-11-27 NOTE — TELEPHONE ENCOUNTER
Adderall XR      Last Written Prescription Date:  10/26/2023  Last Fill Quantity: 30,   # refills: 0  Last Office Visit: 05/31/2023  Future Office visit:       Routing refill request to provider for review/approval because:       Low Risk (score 7-11)

## 2023-11-30 ENCOUNTER — MYC MEDICAL ADVICE (OUTPATIENT)
Dept: FAMILY MEDICINE | Facility: OTHER | Age: 28
End: 2023-11-30

## 2023-12-01 ENCOUNTER — MYC REFILL (OUTPATIENT)
Dept: FAMILY MEDICINE | Facility: OTHER | Age: 28
End: 2023-12-01

## 2023-12-01 DIAGNOSIS — F90.0 ATTENTION DEFICIT HYPERACTIVITY DISORDER (ADHD), PREDOMINANTLY INATTENTIVE TYPE: ICD-10-CM

## 2023-12-01 RX ORDER — DEXTROAMPHETAMINE SACCHARATE, AMPHETAMINE ASPARTATE MONOHYDRATE, DEXTROAMPHETAMINE SULFATE AND AMPHETAMINE SULFATE 7.5; 7.5; 7.5; 7.5 MG/1; MG/1; MG/1; MG/1
30 CAPSULE, EXTENDED RELEASE ORAL DAILY
Qty: 30 CAPSULE | Refills: 0 | Status: SHIPPED | OUTPATIENT
Start: 2023-12-01 | End: 2023-12-26

## 2023-12-01 NOTE — TELEPHONE ENCOUNTER
ADDERALL XR      Last Written Prescription Date:  12-1-23  Last Fill Quantity: 30,   # refills: 0  Last Office Visit: 5-31-23  Future Office visit:       Routing refill request to provider for review/approval because:  JUST FILLED

## 2023-12-04 RX ORDER — DEXTROAMPHETAMINE SACCHARATE, AMPHETAMINE ASPARTATE MONOHYDRATE, DEXTROAMPHETAMINE SULFATE AND AMPHETAMINE SULFATE 7.5; 7.5; 7.5; 7.5 MG/1; MG/1; MG/1; MG/1
30 CAPSULE, EXTENDED RELEASE ORAL DAILY
Qty: 30 CAPSULE | Refills: 0 | OUTPATIENT
Start: 2023-12-04

## 2023-12-26 ENCOUNTER — MYC REFILL (OUTPATIENT)
Dept: FAMILY MEDICINE | Facility: OTHER | Age: 28
End: 2023-12-26

## 2023-12-26 DIAGNOSIS — F90.0 ATTENTION DEFICIT HYPERACTIVITY DISORDER (ADHD), PREDOMINANTLY INATTENTIVE TYPE: ICD-10-CM

## 2023-12-27 NOTE — TELEPHONE ENCOUNTER
Adderall  Last Written Prescription Date: 12/1/23  Last Fill Quantity: 30 # of Refills: 0  Last Office Visit: 5/31/23

## 2023-12-28 RX ORDER — DEXTROAMPHETAMINE SACCHARATE, AMPHETAMINE ASPARTATE MONOHYDRATE, DEXTROAMPHETAMINE SULFATE AND AMPHETAMINE SULFATE 7.5; 7.5; 7.5; 7.5 MG/1; MG/1; MG/1; MG/1
30 CAPSULE, EXTENDED RELEASE ORAL DAILY
Qty: 30 CAPSULE | Refills: 0 | Status: SHIPPED | OUTPATIENT
Start: 2023-12-28

## 2024-01-15 ENCOUNTER — MYC MEDICAL ADVICE (OUTPATIENT)
Dept: FAMILY MEDICINE | Facility: OTHER | Age: 29
End: 2024-01-15

## 2024-01-19 NOTE — PROGRESS NOTES
"  Assessment & Plan     Attention deficit hyperactivity disorder (ADHD), predominantly inattentive type  We are going to increase a 10 mg dose in afternoon asleep by 6, the 30 mg seems to help him.    - amphetamine-dextroamphetamine (ADDERALL XR) 30 MG 24 hr capsule; Take 1 capsule (30 mg) by mouth daily for 30 days  - amphetamine-dextroamphetamine (ADDERALL XR) 30 MG 24 hr capsule; Take 1 capsule (30 mg) by mouth daily for 30 days  - amphetamine-dextroamphetamine (ADDERALL XR) 30 MG 24 hr capsule; Take 1 capsule (30 mg) by mouth daily for 30 days  - amphetamine-dextroamphetamine (ADDERALL) 10 MG tablet; Take 1 tablet (10 mg) by mouth daily for 30 days At noon  - amphetamine-dextroamphetamine (ADDERALL) 10 MG tablet; Take 1 tablet (10 mg) by mouth daily for 30 days At noon  - amphetamine-dextroamphetamine (ADDERALL) 10 MG tablet; Take 1 tablet (10 mg) by mouth daily for 30 days At noon.    Review of external notes as documented elsewhere in note  Ordering of each unique test  Prescription drug management  10 minutes spent by me on the date of the encounter doing chart review, history and exam, documentation and further activities per the note      BMI  Estimated body mass index is 25.39 kg/m  as calculated from the following:    Height as of this encounter: 1.905 m (6' 3\").    Weight as of this encounter: 92.1 kg (203 lb 1.6 oz).       See Patient Instructions    No follow-ups on file.    Luana Castaneda is a 28 year old, presenting for the following health issues:  Follow Up        1/22/2024     8:10 AM   Additional Questions   Roomed by Khalida Ralph   Accompanied by self         1/22/2024     8:10 AM   Patient Reported Additional Medications   Patient reports taking the following new medications none     HPI     ADHD  Onset: since childhood   Description:   Easily distracted: YES  Short attention span: YES  Trouble following directions: YES   Impulsive behavior: No   Trouble completing tasks: YES- At " "times-medication helps- trouble getting through whole day-day starts at 4 in the morning  Accompanying Signs & Symptoms:        Change in sleep pattern: no  Irritability at certain times of the day: No  Socially withdrawn: No  Depression symptoms: No  Anxiety symptoms: No  History:  Caffeine intake: Moderate  Loss of appetite: No  Healthy diet: YES  Did you have problems in school or with previous employment: YES- in the past  Family history of ADHD: unaware  Have you had an evaluation for ADHD in the past: YES  Do you use alcohol or drugs: YES- alcohol every once in a while  Therapies tried: Adderall (concerta) with moderate relief - struggle starts about 2 in the afternoon        Review of Systems  Constitutional, HEENT, cardiovascular, pulmonary, gi and gu systems are negative, except as otherwise noted.      Objective    /84   Pulse 98   Temp 97.3  F (36.3  C) (Tympanic)   Resp 16   Ht 1.905 m (6' 3\")   Wt 92.1 kg (203 lb 1.6 oz)   SpO2 100%   BMI 25.39 kg/m    Body mass index is 25.39 kg/m .  Physical Exam   GENERAL: alert and no distress  NECK: no adenopathy, no asymmetry, masses, or scars  RESP: lungs clear to auscultation - no rales, rhonchi or wheezes  CV: regular rate and rhythm, normal S1 S2, no S3 or S4, no murmur, click or rub, no peripheral edema  MS: no gross musculoskeletal defects noted, no edema  SKIN: no suspicious lesions or rashes  NEURO: Normal strength and tone, mentation intact and speech normal  PSYCH: mentation appears normal, affect normal/bright    No results found for any visits on 01/22/24.        Signed Electronically by: MELVI Sheppard    "

## 2024-01-22 ENCOUNTER — OFFICE VISIT (OUTPATIENT)
Dept: FAMILY MEDICINE | Facility: OTHER | Age: 29
End: 2024-01-22
Attending: PHYSICIAN ASSISTANT
Payer: COMMERCIAL

## 2024-01-22 VITALS
RESPIRATION RATE: 16 BRPM | BODY MASS INDEX: 25.25 KG/M2 | HEART RATE: 98 BPM | OXYGEN SATURATION: 100 % | TEMPERATURE: 97.3 F | HEIGHT: 75 IN | SYSTOLIC BLOOD PRESSURE: 136 MMHG | DIASTOLIC BLOOD PRESSURE: 84 MMHG | WEIGHT: 203.1 LBS

## 2024-01-22 DIAGNOSIS — F90.0 ATTENTION DEFICIT HYPERACTIVITY DISORDER (ADHD), PREDOMINANTLY INATTENTIVE TYPE: ICD-10-CM

## 2024-01-22 PROCEDURE — 99213 OFFICE O/P EST LOW 20 MIN: CPT | Performed by: PHYSICIAN ASSISTANT

## 2024-01-22 RX ORDER — DEXTROAMPHETAMINE SACCHARATE, AMPHETAMINE ASPARTATE, DEXTROAMPHETAMINE SULFATE AND AMPHETAMINE SULFATE 2.5; 2.5; 2.5; 2.5 MG/1; MG/1; MG/1; MG/1
10 TABLET ORAL DAILY
Qty: 30 TABLET | Refills: 0 | Status: SHIPPED | OUTPATIENT
Start: 2024-03-24 | End: 2024-04-17

## 2024-01-22 RX ORDER — DEXTROAMPHETAMINE SACCHARATE, AMPHETAMINE ASPARTATE MONOHYDRATE, DEXTROAMPHETAMINE SULFATE AND AMPHETAMINE SULFATE 7.5; 7.5; 7.5; 7.5 MG/1; MG/1; MG/1; MG/1
30 CAPSULE, EXTENDED RELEASE ORAL DAILY
Qty: 30 CAPSULE | Refills: 0 | Status: SHIPPED | OUTPATIENT
Start: 2024-03-24 | End: 2024-04-17

## 2024-01-22 RX ORDER — DEXTROAMPHETAMINE SACCHARATE, AMPHETAMINE ASPARTATE MONOHYDRATE, DEXTROAMPHETAMINE SULFATE AND AMPHETAMINE SULFATE 7.5; 7.5; 7.5; 7.5 MG/1; MG/1; MG/1; MG/1
30 CAPSULE, EXTENDED RELEASE ORAL DAILY
Qty: 30 CAPSULE | Refills: 0 | Status: SHIPPED | OUTPATIENT
Start: 2024-01-22 | End: 2024-02-21

## 2024-01-22 RX ORDER — DEXTROAMPHETAMINE SACCHARATE, AMPHETAMINE ASPARTATE, DEXTROAMPHETAMINE SULFATE AND AMPHETAMINE SULFATE 2.5; 2.5; 2.5; 2.5 MG/1; MG/1; MG/1; MG/1
10 TABLET ORAL DAILY
Qty: 30 TABLET | Refills: 0 | Status: SHIPPED | OUTPATIENT
Start: 2024-01-22 | End: 2024-02-21

## 2024-01-22 RX ORDER — DEXTROAMPHETAMINE SACCHARATE, AMPHETAMINE ASPARTATE MONOHYDRATE, DEXTROAMPHETAMINE SULFATE AND AMPHETAMINE SULFATE 7.5; 7.5; 7.5; 7.5 MG/1; MG/1; MG/1; MG/1
30 CAPSULE, EXTENDED RELEASE ORAL DAILY
Qty: 30 CAPSULE | Refills: 0 | Status: SHIPPED | OUTPATIENT
Start: 2024-02-22 | End: 2024-03-23

## 2024-01-22 RX ORDER — DEXTROAMPHETAMINE SACCHARATE, AMPHETAMINE ASPARTATE, DEXTROAMPHETAMINE SULFATE AND AMPHETAMINE SULFATE 2.5; 2.5; 2.5; 2.5 MG/1; MG/1; MG/1; MG/1
10 TABLET ORAL DAILY
Qty: 30 TABLET | Refills: 0 | Status: SHIPPED | OUTPATIENT
Start: 2024-02-22 | End: 2024-03-23

## 2024-01-22 ASSESSMENT — PAIN SCALES - GENERAL: PAINLEVEL: NO PAIN (0)

## 2024-02-22 ENCOUNTER — MYC REFILL (OUTPATIENT)
Dept: FAMILY MEDICINE | Facility: OTHER | Age: 29
End: 2024-02-22

## 2024-02-22 DIAGNOSIS — F90.0 ATTENTION DEFICIT HYPERACTIVITY DISORDER (ADHD), PREDOMINANTLY INATTENTIVE TYPE: ICD-10-CM

## 2024-02-22 RX ORDER — DEXTROAMPHETAMINE SACCHARATE, AMPHETAMINE ASPARTATE MONOHYDRATE, DEXTROAMPHETAMINE SULFATE AND AMPHETAMINE SULFATE 7.5; 7.5; 7.5; 7.5 MG/1; MG/1; MG/1; MG/1
30 CAPSULE, EXTENDED RELEASE ORAL DAILY
Qty: 30 CAPSULE | Refills: 0 | OUTPATIENT
Start: 2024-02-22

## 2024-02-22 RX ORDER — DEXTROAMPHETAMINE SACCHARATE, AMPHETAMINE ASPARTATE, DEXTROAMPHETAMINE SULFATE AND AMPHETAMINE SULFATE 2.5; 2.5; 2.5; 2.5 MG/1; MG/1; MG/1; MG/1
10 TABLET ORAL DAILY
Qty: 30 TABLET | Refills: 0 | OUTPATIENT
Start: 2024-02-22

## 2024-04-17 ENCOUNTER — MYC REFILL (OUTPATIENT)
Dept: FAMILY MEDICINE | Facility: OTHER | Age: 29
End: 2024-04-17

## 2024-04-17 DIAGNOSIS — F90.0 ATTENTION DEFICIT HYPERACTIVITY DISORDER (ADHD), PREDOMINANTLY INATTENTIVE TYPE: ICD-10-CM

## 2024-04-17 RX ORDER — DEXTROAMPHETAMINE SACCHARATE, AMPHETAMINE ASPARTATE MONOHYDRATE, DEXTROAMPHETAMINE SULFATE AND AMPHETAMINE SULFATE 7.5; 7.5; 7.5; 7.5 MG/1; MG/1; MG/1; MG/1
30 CAPSULE, EXTENDED RELEASE ORAL DAILY
Qty: 30 CAPSULE | Refills: 0 | Status: SHIPPED | OUTPATIENT
Start: 2024-04-17 | End: 2024-05-16

## 2024-04-17 RX ORDER — DEXTROAMPHETAMINE SACCHARATE, AMPHETAMINE ASPARTATE MONOHYDRATE, DEXTROAMPHETAMINE SULFATE AND AMPHETAMINE SULFATE 7.5; 7.5; 7.5; 7.5 MG/1; MG/1; MG/1; MG/1
30 CAPSULE, EXTENDED RELEASE ORAL DAILY
Qty: 30 CAPSULE | Refills: 0 | OUTPATIENT
Start: 2024-04-17

## 2024-04-17 RX ORDER — DEXTROAMPHETAMINE SACCHARATE, AMPHETAMINE ASPARTATE, DEXTROAMPHETAMINE SULFATE AND AMPHETAMINE SULFATE 2.5; 2.5; 2.5; 2.5 MG/1; MG/1; MG/1; MG/1
10 TABLET ORAL DAILY
Qty: 30 TABLET | Refills: 0 | Status: SHIPPED | OUTPATIENT
Start: 2024-04-17 | End: 2024-05-16

## 2024-04-17 RX ORDER — DEXTROAMPHETAMINE SACCHARATE, AMPHETAMINE ASPARTATE, DEXTROAMPHETAMINE SULFATE AND AMPHETAMINE SULFATE 2.5; 2.5; 2.5; 2.5 MG/1; MG/1; MG/1; MG/1
TABLET ORAL
Qty: 30 TABLET | Refills: 0 | OUTPATIENT
Start: 2024-04-17

## 2024-04-17 NOTE — TELEPHONE ENCOUNTER
amphetamine-dextroamphetamine (ADDERALL XR) 30 MG 24 hr capsule         Last Written Prescription Date:  3/24/2024  Last Fill Quantity: 30,   # refills: 0  Last Office Visit: 1/22/2024  Future Office visit:       Routing refill request to provider for review/approval because:      amphetamine-dextroamphetamine (ADDERALL) 10 MG tablet       Last Written Prescription Date:  3/24/2024  Last Fill Quantity: 30,   # refills: 0  Last Office Visit: 1/22/2024  Future Office visit:       Routing refill request to provider for review/approval because:      Kimberly Boecker, RN

## 2024-05-16 DIAGNOSIS — F90.0 ATTENTION DEFICIT HYPERACTIVITY DISORDER (ADHD), PREDOMINANTLY INATTENTIVE TYPE: ICD-10-CM

## 2024-05-16 RX ORDER — DEXTROAMPHETAMINE SACCHARATE, AMPHETAMINE ASPARTATE, DEXTROAMPHETAMINE SULFATE AND AMPHETAMINE SULFATE 2.5; 2.5; 2.5; 2.5 MG/1; MG/1; MG/1; MG/1
TABLET ORAL
Qty: 30 TABLET | Refills: 0 | Status: SHIPPED | OUTPATIENT
Start: 2024-05-16 | End: 2024-06-21

## 2024-05-16 RX ORDER — DEXTROAMPHETAMINE SACCHARATE, AMPHETAMINE ASPARTATE MONOHYDRATE, DEXTROAMPHETAMINE SULFATE AND AMPHETAMINE SULFATE 7.5; 7.5; 7.5; 7.5 MG/1; MG/1; MG/1; MG/1
30 CAPSULE, EXTENDED RELEASE ORAL DAILY
Qty: 30 CAPSULE | Refills: 0 | Status: SHIPPED | OUTPATIENT
Start: 2024-05-16 | End: 2024-06-21

## 2024-05-16 NOTE — TELEPHONE ENCOUNTER
amphetamine-dextroamphetamine (ADDERALL XR) 30 MG 24 hr capsule 30 capsule 0 4/17/2024       amphetamine-dextroamphetamine (ADDERALL) 10 MG tablet 30 tablet 0 4/17/2024       Last Office Visit: 01/22/24  Future Office visit:       Routing refill request to provider for review/approval because:

## 2024-06-21 ENCOUNTER — MYC REFILL (OUTPATIENT)
Dept: FAMILY MEDICINE | Facility: OTHER | Age: 29
End: 2024-06-21

## 2024-06-21 DIAGNOSIS — F90.0 ATTENTION DEFICIT HYPERACTIVITY DISORDER (ADHD), PREDOMINANTLY INATTENTIVE TYPE: ICD-10-CM

## 2024-06-21 RX ORDER — DEXTROAMPHETAMINE SACCHARATE, AMPHETAMINE ASPARTATE, DEXTROAMPHETAMINE SULFATE AND AMPHETAMINE SULFATE 2.5; 2.5; 2.5; 2.5 MG/1; MG/1; MG/1; MG/1
TABLET ORAL
Qty: 30 TABLET | Refills: 0 | Status: SHIPPED | OUTPATIENT
Start: 2024-06-21 | End: 2024-07-18

## 2024-06-21 RX ORDER — DEXTROAMPHETAMINE SACCHARATE, AMPHETAMINE ASPARTATE MONOHYDRATE, DEXTROAMPHETAMINE SULFATE AND AMPHETAMINE SULFATE 7.5; 7.5; 7.5; 7.5 MG/1; MG/1; MG/1; MG/1
30 CAPSULE, EXTENDED RELEASE ORAL DAILY
Qty: 30 CAPSULE | Refills: 0 | Status: SHIPPED | OUTPATIENT
Start: 2024-06-21 | End: 2024-07-18

## 2024-06-21 NOTE — TELEPHONE ENCOUNTER
Adderall both signed 5/16 #30  Patient overdue for follow up visit.  Last visit 5/31/23  Note to Huc to please call patient to schedule follow up.  Anjana Lamb, RN  Care Coordination

## 2024-07-06 ENCOUNTER — HEALTH MAINTENANCE LETTER (OUTPATIENT)
Age: 29
End: 2024-07-06

## 2024-07-18 ENCOUNTER — MYC REFILL (OUTPATIENT)
Dept: FAMILY MEDICINE | Facility: OTHER | Age: 29
End: 2024-07-18

## 2024-07-18 DIAGNOSIS — F90.0 ATTENTION DEFICIT HYPERACTIVITY DISORDER (ADHD), PREDOMINANTLY INATTENTIVE TYPE: ICD-10-CM

## 2024-07-18 RX ORDER — DEXTROAMPHETAMINE SACCHARATE, AMPHETAMINE ASPARTATE, DEXTROAMPHETAMINE SULFATE AND AMPHETAMINE SULFATE 2.5; 2.5; 2.5; 2.5 MG/1; MG/1; MG/1; MG/1
TABLET ORAL
Qty: 30 TABLET | Refills: 0 | Status: SHIPPED | OUTPATIENT
Start: 2024-07-18 | End: 2024-08-14

## 2024-07-18 RX ORDER — DEXTROAMPHETAMINE SACCHARATE, AMPHETAMINE ASPARTATE MONOHYDRATE, DEXTROAMPHETAMINE SULFATE AND AMPHETAMINE SULFATE 7.5; 7.5; 7.5; 7.5 MG/1; MG/1; MG/1; MG/1
30 CAPSULE, EXTENDED RELEASE ORAL DAILY
Qty: 30 CAPSULE | Refills: 0 | Status: SHIPPED | OUTPATIENT
Start: 2024-07-18 | End: 2024-08-14

## 2024-07-18 NOTE — TELEPHONE ENCOUNTER
Adderall 10 mg  Last Written Prescription Date: 6/21/24  Last Fill Quantity: 30 # of Refills: 0  Last Office Visit: 1/22/24    Adderall 30 mg  Last Written Prescription Date: 6/21/24  Last Fill Quantity: 30 # of Refills: 0  Last Office Visit: 1/22/24

## 2024-07-25 ENCOUNTER — APPOINTMENT (OUTPATIENT)
Dept: CHIROPRACTIC MEDICINE | Facility: OTHER | Age: 29
End: 2024-07-25

## 2024-07-25 ENCOUNTER — APPOINTMENT (OUTPATIENT)
Dept: OCCUPATIONAL MEDICINE | Facility: OTHER | Age: 29
End: 2024-07-25

## 2024-07-25 PROCEDURE — 99499 UNLISTED E&M SERVICE: CPT

## 2024-08-14 ENCOUNTER — MYC REFILL (OUTPATIENT)
Dept: FAMILY MEDICINE | Facility: OTHER | Age: 29
End: 2024-08-14

## 2024-08-14 DIAGNOSIS — F90.0 ATTENTION DEFICIT HYPERACTIVITY DISORDER (ADHD), PREDOMINANTLY INATTENTIVE TYPE: ICD-10-CM

## 2024-08-14 NOTE — TELEPHONE ENCOUNTER
amphetamine-dextroamphetamine (ADDERALL) 10 MG tablet       Last Written Prescription Date:  07/18/2024  Last Fill Quantity: 30,   # refills: 0  Last Office Visit: 01/22/2024  Future Office visit:    Next 5 appointments (look out 90 days)      Oct 09, 2024 11:15 AM  (Arrive by 11:00 AM)  Provider Visit with MELVI Perez  [facility] [address] [facility] [address]  [phone]             Routing refill request to provider for review/approval because:      amphetamine-dextroamphetamine (ADDERALL XR) 30 MG 24 hr capsule       Last Written Prescription Date:  07/18/2024  Last Fill Quantity: 30,   # refills: 0  Last Office Visit: 01/22/2024  Future Office visit:    Next 5 appointments (look out 90 days)      Oct 09, 2024 11:15 AM  (Arrive by 11:00 AM)  Provider Visit with MELVI Perez  [facility] [address] [facility] [address]  [phone]             Routing refill request to provider for review/approval because:      Kimberly Boecker, RN

## 2024-08-16 RX ORDER — DEXTROAMPHETAMINE SACCHARATE, AMPHETAMINE ASPARTATE, DEXTROAMPHETAMINE SULFATE AND AMPHETAMINE SULFATE 2.5; 2.5; 2.5; 2.5 MG/1; MG/1; MG/1; MG/1
TABLET ORAL
Qty: 30 TABLET | Refills: 0 | Status: SHIPPED | OUTPATIENT
Start: 2024-08-16 | End: 2024-09-11

## 2024-08-16 RX ORDER — DEXTROAMPHETAMINE SACCHARATE, AMPHETAMINE ASPARTATE MONOHYDRATE, DEXTROAMPHETAMINE SULFATE AND AMPHETAMINE SULFATE 7.5; 7.5; 7.5; 7.5 MG/1; MG/1; MG/1; MG/1
30 CAPSULE, EXTENDED RELEASE ORAL DAILY
Qty: 30 CAPSULE | Refills: 0 | Status: SHIPPED | OUTPATIENT
Start: 2024-08-16 | End: 2024-09-11

## 2024-09-11 ENCOUNTER — MYC REFILL (OUTPATIENT)
Dept: FAMILY MEDICINE | Facility: OTHER | Age: 29
End: 2024-09-11

## 2024-09-11 DIAGNOSIS — F90.0 ATTENTION DEFICIT HYPERACTIVITY DISORDER (ADHD), PREDOMINANTLY INATTENTIVE TYPE: ICD-10-CM

## 2024-09-11 NOTE — TELEPHONE ENCOUNTER
Adderall 10 mg  Last Written Prescription Date: 8/16/24  Last Fill Quantity: 30 # of Refills: 0  Last Office Visit: 1/22/24    Adderall 30 mg  Last Written Prescription Date: 8/16/24  Last Fill Quantity: 30 # of Refills: 0  Last Office Visit: 1/22/24

## 2024-09-13 RX ORDER — DEXTROAMPHETAMINE SACCHARATE, AMPHETAMINE ASPARTATE MONOHYDRATE, DEXTROAMPHETAMINE SULFATE AND AMPHETAMINE SULFATE 7.5; 7.5; 7.5; 7.5 MG/1; MG/1; MG/1; MG/1
30 CAPSULE, EXTENDED RELEASE ORAL DAILY
Qty: 30 CAPSULE | Refills: 0 | Status: SHIPPED | OUTPATIENT
Start: 2024-09-13

## 2024-09-13 RX ORDER — DEXTROAMPHETAMINE SACCHARATE, AMPHETAMINE ASPARTATE, DEXTROAMPHETAMINE SULFATE AND AMPHETAMINE SULFATE 2.5; 2.5; 2.5; 2.5 MG/1; MG/1; MG/1; MG/1
TABLET ORAL
Qty: 30 TABLET | Refills: 0 | Status: SHIPPED | OUTPATIENT
Start: 2024-09-13

## 2024-10-14 ENCOUNTER — MYC REFILL (OUTPATIENT)
Dept: FAMILY MEDICINE | Facility: OTHER | Age: 29
End: 2024-10-14

## 2024-10-14 DIAGNOSIS — F90.0 ATTENTION DEFICIT HYPERACTIVITY DISORDER (ADHD), PREDOMINANTLY INATTENTIVE TYPE: ICD-10-CM

## 2024-10-14 RX ORDER — DEXTROAMPHETAMINE SACCHARATE, AMPHETAMINE ASPARTATE MONOHYDRATE, DEXTROAMPHETAMINE SULFATE AND AMPHETAMINE SULFATE 7.5; 7.5; 7.5; 7.5 MG/1; MG/1; MG/1; MG/1
30 CAPSULE, EXTENDED RELEASE ORAL DAILY
Qty: 30 CAPSULE | Refills: 0 | Status: SHIPPED | OUTPATIENT
Start: 2024-10-14 | End: 2024-11-13

## 2024-10-14 RX ORDER — DEXTROAMPHETAMINE SACCHARATE, AMPHETAMINE ASPARTATE, DEXTROAMPHETAMINE SULFATE AND AMPHETAMINE SULFATE 2.5; 2.5; 2.5; 2.5 MG/1; MG/1; MG/1; MG/1
TABLET ORAL
Qty: 30 TABLET | Refills: 0 | OUTPATIENT
Start: 2024-10-14

## 2024-10-14 RX ORDER — DEXTROAMPHETAMINE SACCHARATE, AMPHETAMINE ASPARTATE, DEXTROAMPHETAMINE SULFATE AND AMPHETAMINE SULFATE 2.5; 2.5; 2.5; 2.5 MG/1; MG/1; MG/1; MG/1
TABLET ORAL
Qty: 30 TABLET | Refills: 0 | Status: SHIPPED | OUTPATIENT
Start: 2024-10-14 | End: 2024-11-13

## 2024-10-14 RX ORDER — DEXTROAMPHETAMINE SACCHARATE, AMPHETAMINE ASPARTATE MONOHYDRATE, DEXTROAMPHETAMINE SULFATE AND AMPHETAMINE SULFATE 7.5; 7.5; 7.5; 7.5 MG/1; MG/1; MG/1; MG/1
30 CAPSULE, EXTENDED RELEASE ORAL DAILY
Qty: 30 CAPSULE | Refills: 0 | OUTPATIENT
Start: 2024-10-14

## 2024-10-14 NOTE — TELEPHONE ENCOUNTER
amphetamine-dextroamphetamine (ADDERALL) 10 MG tablet       Last Written Prescription Date:  09/13/2024  Last Fill Quantity: 30,   # refills: 0  Last Office Visit: 01/22/2024  Future Office visit:    Next 5 appointments (look out 90 days)      Nov 01, 2024 3:00 PM  (Arrive by 2:45 PM)  Provider Visit with MELVI Perez  [facility] ([facility] ) [address] [place] [address]  [phone]             Routing refill request to provider for review/approval because:      amphetamine-dextroamphetamine (ADDERALL XR) 30 MG 24 hr capsule       Last Written Prescription Date:  09/13/2024  Last Fill Quantity: 30,   # refills: 0  Last Office Visit: 01/22/2024  Future Office visit:    Next 5 appointments (look out 90 days)      Nov 01, 2024 3:00 PM  (Arrive by 2:45 PM)  Provider Visit with MELVI Perez  [facility] ([facility] ) [address] [facility] [address]  [phone]             Routing refill request to provider for review/approval because:      Kimberly Boecker, RN

## 2024-11-01 ENCOUNTER — MYC MEDICAL ADVICE (OUTPATIENT)
Dept: FAMILY MEDICINE | Facility: OTHER | Age: 29
End: 2024-11-01

## 2024-11-13 ENCOUNTER — MYC REFILL (OUTPATIENT)
Dept: FAMILY MEDICINE | Facility: OTHER | Age: 29
End: 2024-11-13

## 2024-11-13 DIAGNOSIS — F90.0 ATTENTION DEFICIT HYPERACTIVITY DISORDER (ADHD), PREDOMINANTLY INATTENTIVE TYPE: ICD-10-CM

## 2024-11-13 RX ORDER — DEXTROAMPHETAMINE SACCHARATE, AMPHETAMINE ASPARTATE, DEXTROAMPHETAMINE SULFATE AND AMPHETAMINE SULFATE 2.5; 2.5; 2.5; 2.5 MG/1; MG/1; MG/1; MG/1
TABLET ORAL
Qty: 30 TABLET | Refills: 0 | Status: SHIPPED | OUTPATIENT
Start: 2024-11-13

## 2024-11-13 RX ORDER — DEXTROAMPHETAMINE SACCHARATE, AMPHETAMINE ASPARTATE MONOHYDRATE, DEXTROAMPHETAMINE SULFATE AND AMPHETAMINE SULFATE 7.5; 7.5; 7.5; 7.5 MG/1; MG/1; MG/1; MG/1
30 CAPSULE, EXTENDED RELEASE ORAL DAILY
Qty: 30 CAPSULE | Refills: 0 | Status: SHIPPED | OUTPATIENT
Start: 2024-11-13

## 2024-11-13 NOTE — TELEPHONE ENCOUNTER
amphetamine-dextroamphetamine (ADDERALL) 10 MG tablet       Last Written Prescription Date:  10/14/2024  Last Fill Quantity: 30,   # refills: 0  Last Office Visit: 01/22/2024  Future Office visit:       Routing refill request to provider for review/approval because:      amphetamine-dextroamphetamine (ADDERALL XR) 30 MG 24 hr capsule       Last Written Prescription Date:  10/14/2024  Last Fill Quantity: 30,   # refills: 0  Last Office Visit: 01/22/2024  Future Office visit:       Routing refill request to provider for review/approval because:      Kimberly Boecker, RN

## 2025-01-17 ENCOUNTER — MYC MEDICAL ADVICE (OUTPATIENT)
Dept: FAMILY MEDICINE | Facility: OTHER | Age: 30
End: 2025-01-17

## 2025-01-17 DIAGNOSIS — F90.0 ATTENTION DEFICIT HYPERACTIVITY DISORDER (ADHD), PREDOMINANTLY INATTENTIVE TYPE: ICD-10-CM

## 2025-01-20 ENCOUNTER — MYC REFILL (OUTPATIENT)
Dept: FAMILY MEDICINE | Facility: OTHER | Age: 30
End: 2025-01-20

## 2025-01-20 DIAGNOSIS — F90.0 ATTENTION DEFICIT HYPERACTIVITY DISORDER (ADHD), PREDOMINANTLY INATTENTIVE TYPE: ICD-10-CM

## 2025-01-20 RX ORDER — DEXTROAMPHETAMINE SACCHARATE, AMPHETAMINE ASPARTATE MONOHYDRATE, DEXTROAMPHETAMINE SULFATE AND AMPHETAMINE SULFATE 7.5; 7.5; 7.5; 7.5 MG/1; MG/1; MG/1; MG/1
30 CAPSULE, EXTENDED RELEASE ORAL DAILY
Qty: 30 CAPSULE | Refills: 0 | Status: SHIPPED | OUTPATIENT
Start: 2025-01-20

## 2025-01-20 RX ORDER — DEXTROAMPHETAMINE SACCHARATE, AMPHETAMINE ASPARTATE, DEXTROAMPHETAMINE SULFATE AND AMPHETAMINE SULFATE 2.5; 2.5; 2.5; 2.5 MG/1; MG/1; MG/1; MG/1
TABLET ORAL
Qty: 30 TABLET | Refills: 0 | Status: SHIPPED | OUTPATIENT
Start: 2025-01-20

## 2025-01-21 RX ORDER — DEXTROAMPHETAMINE SACCHARATE, AMPHETAMINE ASPARTATE, DEXTROAMPHETAMINE SULFATE AND AMPHETAMINE SULFATE 2.5; 2.5; 2.5; 2.5 MG/1; MG/1; MG/1; MG/1
TABLET ORAL
Qty: 30 TABLET | Refills: 0 | OUTPATIENT
Start: 2025-01-21

## 2025-01-21 RX ORDER — DEXTROAMPHETAMINE SACCHARATE, AMPHETAMINE ASPARTATE MONOHYDRATE, DEXTROAMPHETAMINE SULFATE AND AMPHETAMINE SULFATE 7.5; 7.5; 7.5; 7.5 MG/1; MG/1; MG/1; MG/1
30 CAPSULE, EXTENDED RELEASE ORAL DAILY
Qty: 30 CAPSULE | Refills: 0 | OUTPATIENT
Start: 2025-01-21

## 2025-02-13 ENCOUNTER — MYC REFILL (OUTPATIENT)
Dept: FAMILY MEDICINE | Facility: OTHER | Age: 30
End: 2025-02-13

## 2025-02-13 DIAGNOSIS — F90.0 ATTENTION DEFICIT HYPERACTIVITY DISORDER (ADHD), PREDOMINANTLY INATTENTIVE TYPE: ICD-10-CM

## 2025-02-13 RX ORDER — DEXTROAMPHETAMINE SACCHARATE, AMPHETAMINE ASPARTATE MONOHYDRATE, DEXTROAMPHETAMINE SULFATE AND AMPHETAMINE SULFATE 7.5; 7.5; 7.5; 7.5 MG/1; MG/1; MG/1; MG/1
30 CAPSULE, EXTENDED RELEASE ORAL DAILY
Qty: 30 CAPSULE | Refills: 0 | Status: SHIPPED | OUTPATIENT
Start: 2025-02-13

## 2025-02-13 RX ORDER — DEXTROAMPHETAMINE SACCHARATE, AMPHETAMINE ASPARTATE, DEXTROAMPHETAMINE SULFATE AND AMPHETAMINE SULFATE 2.5; 2.5; 2.5; 2.5 MG/1; MG/1; MG/1; MG/1
TABLET ORAL
Qty: 30 TABLET | Refills: 0 | Status: SHIPPED | OUTPATIENT
Start: 2025-02-13

## 2025-02-13 NOTE — TELEPHONE ENCOUNTER
amphetamine-dextroamphetamine (ADDERALL) 10 MG tablet       Last Written Prescription Date:  01/20/2025  Last Fill Quantity: 30,   # refills: 0  Last Office Visit: 01/22/2024  Future Office visit:       Routing refill request to provider for review/approval because:      amphetamine-dextroamphetamine (ADDERALL XR) 30 MG 24 hr capsule       Last Written Prescription Date:  01/20/2025  Last Fill Quantity: 30,   # refills: 0  Last Office Visit: 01/22/2024  Future Office visit:       Routing refill request to provider for review/approval because:      Kimberly Boecker, RN

## 2025-02-19 ENCOUNTER — OFFICE VISIT (OUTPATIENT)
Dept: FAMILY MEDICINE | Facility: OTHER | Age: 30
End: 2025-02-19
Attending: STUDENT IN AN ORGANIZED HEALTH CARE EDUCATION/TRAINING PROGRAM
Payer: COMMERCIAL

## 2025-02-19 VITALS
HEIGHT: 75 IN | OXYGEN SATURATION: 99 % | BODY MASS INDEX: 24.83 KG/M2 | DIASTOLIC BLOOD PRESSURE: 88 MMHG | HEART RATE: 105 BPM | SYSTOLIC BLOOD PRESSURE: 142 MMHG | WEIGHT: 199.7 LBS | TEMPERATURE: 97.4 F

## 2025-02-19 DIAGNOSIS — Z76.89 ENCOUNTER TO ESTABLISH CARE: ICD-10-CM

## 2025-02-19 DIAGNOSIS — R03.0 ELEVATED BP WITHOUT DIAGNOSIS OF HYPERTENSION: Primary | ICD-10-CM

## 2025-02-19 DIAGNOSIS — F17.210 CIGARETTE NICOTINE DEPENDENCE WITHOUT COMPLICATION: ICD-10-CM

## 2025-02-19 DIAGNOSIS — F90.0 ATTENTION DEFICIT HYPERACTIVITY DISORDER (ADHD), PREDOMINANTLY INATTENTIVE TYPE: ICD-10-CM

## 2025-02-19 ASSESSMENT — PATIENT HEALTH QUESTIONNAIRE - PHQ9
10. IF YOU CHECKED OFF ANY PROBLEMS, HOW DIFFICULT HAVE THESE PROBLEMS MADE IT FOR YOU TO DO YOUR WORK, TAKE CARE OF THINGS AT HOME, OR GET ALONG WITH OTHER PEOPLE: SOMEWHAT DIFFICULT
SUM OF ALL RESPONSES TO PHQ QUESTIONS 1-9: 3
SUM OF ALL RESPONSES TO PHQ QUESTIONS 1-9: 3

## 2025-02-19 ASSESSMENT — PAIN SCALES - GENERAL: PAINLEVEL_OUTOF10: NO PAIN (0)

## 2025-02-19 NOTE — PROGRESS NOTES
Assessment & Plan     Encounter to establish care  Patient presents to clinic to establish [facility].  He is a former patient of Dulce Maria  He is due for an annual physical.  We will see him again in May for a physical    Elevated BP without diagnosis of hypertension  BP is elevated today.      Cigarette nicotine dependence without complication  1 PPD since 12.  17 pack years.  Strongly encourage smoking cessation  Quit partners information given  He declines my additional help.  He wants to quit for himself and his children  His quit date is by the time he turns 30.      Attention deficit hyperactivity disorder (ADHD), predominantly inattentive type  On Adderall 30 mg Xr in the morning with 10 mg booster in the afternoon  BP elevated and tachycardic.  Concerned that stimulant is contributing  Consider non stimulant medication such as strattera or wellbutrin?      Nicotine/Tobacco Cessation  He reports that he has been smoking cigarettes. He does not have any smokeless tobacco history on file.  Nicotine/Tobacco Cessation Plan  Self help information given to patient      Return in about 3 months (around 5/12/2025).    Luana Castaneda is a 29 year old, presenting for the following health issues:  Establish Care        2/19/2025     9:34 AM   Additional Questions   Roomed by [facility] DE LA TORRE   Accompanied by self     History of Present Illness       Reason for visit:  Follow up    He eats 0-1 servings of fruits and vegetables daily.He consumes 2 sweetened beverage(s) daily.He exercises with enough effort to increase his heart rate 10 to 19 minutes per day.  He exercises with enough effort to increase his heart rate 4 days per week.   He is taking medications regularly.     Establish care- patient has no concerns that he would like to discuss at this appointment,     Diagnosed with ADHD as a child, used to take Vyvanse.  Quit taking this.  Had a handful of years- it came back  Cannot focus at his job  Lianne, ,  " (sits on computer).  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   Has two kids- Walker and Chep  1 PPD  Has been doing nicotine pouches  1 PPD since 12.  17 pack years.    Had vasectomy at [facility]        Review of Systems  Constitutional, HEENT, cardiovascular, pulmonary, GI, , musculoskeletal, neuro, skin, endocrine and psych systems are negative, except as otherwise noted.      Objective    BP (!) 142/88 (BP Location: Right arm, Patient Position: Sitting, Cuff Size: Adult Regular)   Pulse 105   Temp 97.4  F (36.3  C) (Tympanic)   Ht 1.905 m (6' 3\")   Wt 90.6 kg (199 lb 11.2 oz)   SpO2 99%   BMI 24.96 kg/m    Body mass index is 24.96 kg/m .  Physical Exam   GENERAL: alert and no distress  EYES: Eyes grossly normal to inspection, PERRL and conjunctivae and sclerae normal  HENT: ear canals and TM's normal, nose and mouth without ulcers or lesions  NECK: no adenopathy, no asymmetry, masses, or scars  RESP: lungs clear to auscultation - no rales, rhonchi or wheezes  CV: regular rate and rhythm, normal S1 S2, no S3 or S4, no murmur, click or rub, no peripheral edema  ABDOMEN: soft, nontender, no hepatosplenomegaly, no masses and bowel sounds normal  MS: no gross musculoskeletal defects noted, no edema  SKIN: no suspicious lesions or rashes  NEURO: Normal strength and tone, mentation intact and speech normal  PSYCH: mentation appears normal, affect normal/bright          Signed Electronically by: Leona Aragon MD    "

## 2025-03-18 ENCOUNTER — MYC REFILL (OUTPATIENT)
Dept: FAMILY MEDICINE | Facility: OTHER | Age: 30
End: 2025-03-18

## 2025-03-18 DIAGNOSIS — F90.0 ATTENTION DEFICIT HYPERACTIVITY DISORDER (ADHD), PREDOMINANTLY INATTENTIVE TYPE: ICD-10-CM

## 2025-03-18 NOTE — TELEPHONE ENCOUNTER
amphetamine-dextroamphetamine (ADDERALL XR) 30 MG 24 hr capsule       Last Written Prescription Date:  1/20/2025  Last Fill Quantity: 30,   # refills: 0  Last Office Visit: 2/19/2025    amphetamine-dextroamphetamine (ADDERALL) 10 MG tablet       Last Written Prescription Date:  2/13/2025  Last Fill Quantity: 30,   # refills: 0

## 2025-03-19 RX ORDER — DEXTROAMPHETAMINE SACCHARATE, AMPHETAMINE ASPARTATE, DEXTROAMPHETAMINE SULFATE AND AMPHETAMINE SULFATE 2.5; 2.5; 2.5; 2.5 MG/1; MG/1; MG/1; MG/1
TABLET ORAL
Qty: 30 TABLET | Refills: 0 | Status: SHIPPED | OUTPATIENT
Start: 2025-03-19

## 2025-03-19 RX ORDER — DEXTROAMPHETAMINE SACCHARATE, AMPHETAMINE ASPARTATE MONOHYDRATE, DEXTROAMPHETAMINE SULFATE AND AMPHETAMINE SULFATE 7.5; 7.5; 7.5; 7.5 MG/1; MG/1; MG/1; MG/1
30 CAPSULE, EXTENDED RELEASE ORAL DAILY
Qty: 30 CAPSULE | Refills: 0 | Status: SHIPPED | OUTPATIENT
Start: 2025-03-19

## 2025-04-14 ENCOUNTER — MYC REFILL (OUTPATIENT)
Dept: FAMILY MEDICINE | Facility: OTHER | Age: 30
End: 2025-04-14

## 2025-04-14 DIAGNOSIS — F90.0 ATTENTION DEFICIT HYPERACTIVITY DISORDER (ADHD), PREDOMINANTLY INATTENTIVE TYPE: ICD-10-CM

## 2025-04-16 ENCOUNTER — MYC MEDICAL ADVICE (OUTPATIENT)
Dept: FAMILY MEDICINE | Facility: OTHER | Age: 30
End: 2025-04-16

## 2025-04-16 DIAGNOSIS — F90.0 ATTENTION DEFICIT HYPERACTIVITY DISORDER (ADHD), PREDOMINANTLY INATTENTIVE TYPE: ICD-10-CM

## 2025-04-16 RX ORDER — DEXTROAMPHETAMINE SACCHARATE, AMPHETAMINE ASPARTATE, DEXTROAMPHETAMINE SULFATE AND AMPHETAMINE SULFATE 2.5; 2.5; 2.5; 2.5 MG/1; MG/1; MG/1; MG/1
TABLET ORAL
Qty: 30 TABLET | Refills: 0 | OUTPATIENT
Start: 2025-04-18

## 2025-04-16 RX ORDER — DEXTROAMPHETAMINE SACCHARATE, AMPHETAMINE ASPARTATE MONOHYDRATE, DEXTROAMPHETAMINE SULFATE AND AMPHETAMINE SULFATE 7.5; 7.5; 7.5; 7.5 MG/1; MG/1; MG/1; MG/1
30 CAPSULE, EXTENDED RELEASE ORAL DAILY
Qty: 30 CAPSULE | Refills: 0 | OUTPATIENT
Start: 2025-04-18

## 2025-04-16 NOTE — TELEPHONE ENCOUNTER
Sagar Ocampo, I am actually quite concerned about his BP.  He is only 29 and in the hypertensive range.  Will not refill for now.  Needs to see me back for BP check first and need to consider Non stimulant for his ADHD

## 2025-04-17 RX ORDER — DEXTROAMPHETAMINE SACCHARATE, AMPHETAMINE ASPARTATE, DEXTROAMPHETAMINE SULFATE AND AMPHETAMINE SULFATE 2.5; 2.5; 2.5; 2.5 MG/1; MG/1; MG/1; MG/1
TABLET ORAL
Qty: 30 TABLET | Refills: 0 | OUTPATIENT
Start: 2025-04-17

## 2025-04-17 RX ORDER — DEXTROAMPHETAMINE SACCHARATE, AMPHETAMINE ASPARTATE MONOHYDRATE, DEXTROAMPHETAMINE SULFATE AND AMPHETAMINE SULFATE 7.5; 7.5; 7.5; 7.5 MG/1; MG/1; MG/1; MG/1
30 CAPSULE, EXTENDED RELEASE ORAL DAILY
Qty: 30 CAPSULE | Refills: 0 | OUTPATIENT
Start: 2025-04-17

## 2025-04-17 NOTE — TELEPHONE ENCOUNTER
Pt called and asking for an overbook for elevated blood pressure and ADHD medication refill.  Pt did scheduled an appointment for 05/08/2025 but would like to be seen sooner. Pt did say the mornings work best for him.

## 2025-04-21 ENCOUNTER — OFFICE VISIT (OUTPATIENT)
Dept: FAMILY MEDICINE | Facility: OTHER | Age: 30
End: 2025-04-21
Attending: STUDENT IN AN ORGANIZED HEALTH CARE EDUCATION/TRAINING PROGRAM
Payer: COMMERCIAL

## 2025-04-21 VITALS
TEMPERATURE: 97.7 F | HEIGHT: 75 IN | OXYGEN SATURATION: 100 % | WEIGHT: 194.4 LBS | HEART RATE: 84 BPM | DIASTOLIC BLOOD PRESSURE: 88 MMHG | RESPIRATION RATE: 18 BRPM | BODY MASS INDEX: 24.17 KG/M2 | SYSTOLIC BLOOD PRESSURE: 140 MMHG

## 2025-04-21 DIAGNOSIS — F90.0 ATTENTION DEFICIT HYPERACTIVITY DISORDER (ADHD), PREDOMINANTLY INATTENTIVE TYPE: ICD-10-CM

## 2025-04-21 DIAGNOSIS — R03.0 ELEVATED BLOOD PRESSURE READING WITHOUT DIAGNOSIS OF HYPERTENSION: Primary | ICD-10-CM

## 2025-04-21 DIAGNOSIS — F17.210 CIGARETTE NICOTINE DEPENDENCE WITHOUT COMPLICATION: ICD-10-CM

## 2025-04-21 RX ORDER — DEXTROAMPHETAMINE SACCHARATE, AMPHETAMINE ASPARTATE, DEXTROAMPHETAMINE SULFATE AND AMPHETAMINE SULFATE 2.5; 2.5; 2.5; 2.5 MG/1; MG/1; MG/1; MG/1
TABLET ORAL
Qty: 30 TABLET | Refills: 0 | Status: CANCELLED | OUTPATIENT
Start: 2025-04-21

## 2025-04-21 RX ORDER — DEXTROAMPHETAMINE SACCHARATE, AMPHETAMINE ASPARTATE MONOHYDRATE, DEXTROAMPHETAMINE SULFATE AND AMPHETAMINE SULFATE 7.5; 7.5; 7.5; 7.5 MG/1; MG/1; MG/1; MG/1
30 CAPSULE, EXTENDED RELEASE ORAL DAILY
Qty: 30 CAPSULE | Refills: 0 | Status: CANCELLED | OUTPATIENT
Start: 2025-04-21

## 2025-04-21 ASSESSMENT — PAIN SCALES - GENERAL: PAINLEVEL_OUTOF10: NO PAIN (0)

## 2025-04-21 NOTE — PROGRESS NOTES
Assessment & Plan     Attention deficit hyperactivity disorder (ADHD), predominantly inattentive type  Previously on 30 mg ER and 10 mg booster dose.  Last dose of medication on Friday  He is still hypertensive.  148/80 on my last recheck.  I am concerned that this is medication related as he is quite young to have HTN. He has no significant family history of hypertension    Elevated blood pressure reading without diagnosis of hypertension  BP still elevated.  He reports feeling anxious coming in to clinic  Has been checking BP at home and systolic was 130.  He does not recall diastolic  He could have white coat syndrome but I also cannot exclude this being iatrogenic from his adderall.    His last dose of adderall was on Friday.  Recommend he return to clinic in 2 weeks for another BP check.  Continue checking BP at home and record.  I also emphasized multiple times for him to bring his home BP machine with him  If still hypertensive in 2 weeks, recommend treating BP    Cigarette nicotine dependence without complication  Encourage smoking cessation.  He is working on cutting back  He declines my additional assistance in doing so          Luana Castaneda is a 29 year old, presenting for the following health issues:  Recheck Medication and A.D.H.D        4/21/2025     8:48 AM   Additional Questions   Roomed by Emily DE LA TORRE   Accompanied by self         4/21/2025     8:48 AM   Patient Reported Additional Medications   Patient reports taking the following new medications none     A.D.H.D    History of Present Illness       Reason for visit:  Follow up    He eats 0-1 servings of fruits and vegetables daily.He consumes 2 sweetened beverage(s) daily.He exercises with enough effort to increase his heart rate 30 to 60 minutes per day.  He exercises with enough effort to increase his heart rate 7 days per week.   He is taking medications regularly.        Working on cutting back down on smoking  He tells me completely  "stopped energy drinks.    He tells me that he did have coffee this morning.    Patient endorsing anxiety right now.  He tells me he feels anxious every time he has to come in the clinic  He tells me he has been checking his BP at home at it ran 130 systolic.  Does not recall diastolic.  He tells me this was the highest number he got.    Last dose   Last cigarettes 7:45- had 3 today.  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    Has two kids- 3.5 and 1.5 years old.      Medication Followup of Adderall   Taking Medication as prescribed: yes  Side Effects:  None  Medication Helping Symptoms:  yes        Review of Systems  Constitutional, HEENT, cardiovascular, pulmonary, GI, , musculoskeletal, neuro, skin, endocrine and psych systems are negative, except as otherwise noted.      Objective    BP (!) 140/88 (BP Location: Left arm, Patient Position: Sitting, Cuff Size: Adult Large)   Pulse 84   Temp 97.7  F (36.5  C) (Tympanic)   Resp 18   Ht 1.905 m (6' 3\")   Wt 88.2 kg (194 lb 6.4 oz)   SpO2 100%   BMI 24.30 kg/m    Body mass index is 24.3 kg/m .  Physical Exam   GENERAL: alert and no distress  EYES: Eyes grossly normal to inspection, PERRL and conjunctivae and sclerae normal  HENT: ear canals and TM's normal, nose and mouth without ulcers or lesions  NECK: no adenopathy, no asymmetry, masses, or scars  RESP: lungs clear to auscultation - no rales, rhonchi or wheezes  CV: regular rate and rhythm, normal S1 S2, no S3 or S4, no murmur, click or rub, no peripheral edema  ABDOMEN: soft, nontender, no hepatosplenomegaly, no masses and bowel sounds normal  MS: no gross musculoskeletal defects noted, no edema  SKIN: no suspicious lesions or rashes  NEURO: Normal strength and tone, mentation intact and speech normal  PSYCH: mentation appears normal, affect normal/bright        Signed Electronically by: Leona Aragon MD    "

## 2025-05-05 ENCOUNTER — ALLIED HEALTH/NURSE VISIT (OUTPATIENT)
Dept: FAMILY MEDICINE | Facility: OTHER | Age: 30
End: 2025-05-05
Attending: STUDENT IN AN ORGANIZED HEALTH CARE EDUCATION/TRAINING PROGRAM
Payer: COMMERCIAL

## 2025-05-05 ENCOUNTER — TELEPHONE (OUTPATIENT)
Dept: FAMILY MEDICINE | Facility: OTHER | Age: 30
End: 2025-05-05

## 2025-05-05 VITALS — HEART RATE: 75 BPM | SYSTOLIC BLOOD PRESSURE: 138 MMHG | DIASTOLIC BLOOD PRESSURE: 80 MMHG

## 2025-05-05 DIAGNOSIS — R03.0 ELEVATED BLOOD PRESSURE READING WITHOUT DIAGNOSIS OF HYPERTENSION: Primary | ICD-10-CM

## 2025-05-05 NOTE — PROGRESS NOTES
I met with Leonel Norton at the request of Dr. Aragon to recheck his blood pressure.  Blood pressure medications on the med list were reviewed with patient.    Patient has taken all medications as per usual regimen: No  Patient reports tolerating them without any issues or concerns: NA- hasn't been on medications since weekend before last visit.- not on a BP medication.     Vitals:    05/05/25 1103   BP: 138/80   BP Location: Left arm   Patient Position: Sitting   Cuff Size: Adult Regular   Pulse: 75       Blood pressure was taken, previous encounter was reviewed, recorded blood pressure below 140/90.  Patient was discharged and the note will be sent to the provider for final review.       BP readings at home have been in 130s and recently been 140s and bottom is in the  90s.     Patients home BP cuff today read 161/90 on left arm in clinic and 147/91 on right arm. I rechecked manually and got the same as first manual reading.

## 2025-05-06 NOTE — TELEPHONE ENCOUNTER
I would like to see him on 5/8- I advise he keep this appointment.  We should work up for secondary HTN

## 2025-05-06 NOTE — TELEPHONE ENCOUNTER
Attempted to call patient, no answer. [employer] message sent as well awaiting reply or for patient to view.

## 2025-07-13 ENCOUNTER — HEALTH MAINTENANCE LETTER (OUTPATIENT)
Age: 30
End: 2025-07-13